# Patient Record
Sex: FEMALE | Race: BLACK OR AFRICAN AMERICAN | NOT HISPANIC OR LATINO | Employment: UNEMPLOYED | URBAN - METROPOLITAN AREA
[De-identification: names, ages, dates, MRNs, and addresses within clinical notes are randomized per-mention and may not be internally consistent; named-entity substitution may affect disease eponyms.]

---

## 2022-04-10 ENCOUNTER — HISTORICAL (OUTPATIENT)
Dept: ADMINISTRATIVE | Facility: HOSPITAL | Age: 27
End: 2022-04-10

## 2022-04-11 ENCOUNTER — HISTORICAL (OUTPATIENT)
Dept: ADMINISTRATIVE | Facility: HOSPITAL | Age: 27
End: 2022-04-11

## 2022-04-28 VITALS
SYSTOLIC BLOOD PRESSURE: 135 MMHG | DIASTOLIC BLOOD PRESSURE: 88 MMHG | HEIGHT: 67 IN | OXYGEN SATURATION: 100 % | BODY MASS INDEX: 24.6 KG/M2 | WEIGHT: 156.75 LBS

## 2022-04-28 VITALS
HEIGHT: 67 IN | WEIGHT: 156.75 LBS | SYSTOLIC BLOOD PRESSURE: 135 MMHG | OXYGEN SATURATION: 100 % | DIASTOLIC BLOOD PRESSURE: 88 MMHG | BODY MASS INDEX: 24.6 KG/M2

## 2022-05-19 ENCOUNTER — HOSPITAL ENCOUNTER (EMERGENCY)
Facility: HOSPITAL | Age: 27
Discharge: HOME | End: 2022-05-19
Attending: STUDENT IN AN ORGANIZED HEALTH CARE EDUCATION/TRAINING PROGRAM

## 2022-05-19 VITALS
OXYGEN SATURATION: 99 % | HEART RATE: 96 BPM | DIASTOLIC BLOOD PRESSURE: 74 MMHG | RESPIRATION RATE: 16 BRPM | HEIGHT: 67 IN | SYSTOLIC BLOOD PRESSURE: 120 MMHG | TEMPERATURE: 98 F | BODY MASS INDEX: 24.55 KG/M2

## 2022-05-19 DIAGNOSIS — R00.2 PALPITATIONS: ICD-10-CM

## 2022-05-19 LAB
ALBUMIN SERPL-MCNC: 4.3 GM/DL (ref 3.5–5)
ALBUMIN/GLOB SERPL: 1 RATIO (ref 1.1–2)
ALP SERPL-CCNC: 80 UNIT/L (ref 40–150)
ALT SERPL-CCNC: 7 UNIT/L (ref 0–55)
AMPHET UR QL SCN: NEGATIVE
APPEARANCE UR: CLEAR
AST SERPL-CCNC: 16 UNIT/L (ref 5–34)
BACTERIA #/AREA URNS AUTO: ABNORMAL /HPF
BARBITURATE SCN PRESENT UR: NEGATIVE
BASOPHILS # BLD AUTO: 0.02 X10(3)/MCL (ref 0–0.2)
BASOPHILS NFR BLD AUTO: 0.3 %
BENZODIAZ UR QL SCN: NEGATIVE
BILIRUB UR QL STRIP.AUTO: NEGATIVE MG/DL
BILIRUBIN DIRECT+TOT PNL SERPL-MCNC: 0.3 MG/DL
BUN SERPL-MCNC: 8.6 MG/DL (ref 7–18.7)
CALCIUM SERPL-MCNC: 9.8 MG/DL (ref 8.4–10.2)
CANNABINOIDS UR QL SCN: NEGATIVE
CHLORIDE SERPL-SCNC: 100 MMOL/L (ref 98–107)
CO2 SERPL-SCNC: 26 MMOL/L (ref 22–29)
COCAINE UR QL SCN: NEGATIVE
COLOR UR AUTO: YELLOW
CREAT SERPL-MCNC: 0.76 MG/DL (ref 0.55–1.02)
EOSINOPHIL # BLD AUTO: 0.01 X10(3)/MCL (ref 0–0.9)
EOSINOPHIL NFR BLD AUTO: 0.1 %
ERYTHROCYTE [DISTWIDTH] IN BLOOD BY AUTOMATED COUNT: 11 % (ref 11.5–17)
FENTANYL UR QL SCN: NEGATIVE
GLOBULIN SER-MCNC: 4.2 GM/DL (ref 2.4–3.5)
GLUCOSE SERPL-MCNC: 109 MG/DL (ref 74–100)
GLUCOSE UR QL STRIP.AUTO: NORMAL MG/DL
HCT VFR BLD AUTO: 36.7 % (ref 37–47)
HGB BLD-MCNC: 12.2 GM/DL (ref 12–16)
HYALINE CASTS #/AREA URNS LPF: ABNORMAL /LPF
IMM GRANULOCYTES # BLD AUTO: 0.02 X10(3)/MCL (ref 0–0.02)
IMM GRANULOCYTES NFR BLD AUTO: 0.3 % (ref 0–0.43)
KETONES UR QL STRIP.AUTO: NEGATIVE MG/DL
LEUKOCYTE ESTERASE UR QL STRIP.AUTO: NEGATIVE UNIT/L
LYMPHOCYTES # BLD AUTO: 1.13 X10(3)/MCL (ref 0.6–4.6)
LYMPHOCYTES NFR BLD AUTO: 16.8 %
MCH RBC QN AUTO: 30.7 PG (ref 27–31)
MCHC RBC AUTO-ENTMCNC: 33.2 MG/DL (ref 33–36)
MCV RBC AUTO: 92.2 FL (ref 80–94)
MDMA UR QL SCN: NEGATIVE
MONOCYTES # BLD AUTO: 0.45 X10(3)/MCL (ref 0.1–1.3)
MONOCYTES NFR BLD AUTO: 6.7 %
MUCOUS THREADS URNS QL MICRO: ABNORMAL /LPF
NEUTROPHILS # BLD AUTO: 5.1 X10(3)/MCL (ref 2.1–9.2)
NEUTROPHILS NFR BLD AUTO: 75.8 %
NITRITE UR QL STRIP.AUTO: NEGATIVE
NRBC BLD AUTO-RTO: 0 %
OPIATES UR QL SCN: NEGATIVE
PCP UR QL: NEGATIVE
PH UR STRIP.AUTO: 6 [PH]
PH UR: 7 [PH] (ref 3–11)
PLATELET # BLD AUTO: 334 X10(3)/MCL (ref 130–400)
PMV BLD AUTO: 9.5 FL (ref 9.4–12.4)
POTASSIUM SERPL-SCNC: 3.2 MMOL/L (ref 3.5–5.1)
PROT SERPL-MCNC: 8.5 GM/DL (ref 6.4–8.3)
PROT UR QL STRIP.AUTO: ABNORMAL MG/DL
RBC # BLD AUTO: 3.98 X10(6)/MCL (ref 4.2–5.4)
RBC #/AREA URNS AUTO: ABNORMAL /HPF
RBC UR QL AUTO: NEGATIVE UNIT/L
SODIUM SERPL-SCNC: 138 MMOL/L (ref 136–145)
SP GR UR STRIP.AUTO: 1.02
SPECIFIC GRAVITY, URINE AUTO (.000) (OHS): 1.02 (ref 1–1.03)
SQUAMOUS #/AREA URNS LPF: ABNORMAL /HPF
TSH SERPL-ACNC: 2.06 UIU/ML (ref 0.35–4.94)
UROBILINOGEN UR STRIP-ACNC: NORMAL MG/DL
WBC # SPEC AUTO: 6.7 X10(3)/MCL (ref 4.5–11.5)
WBC #/AREA URNS AUTO: ABNORMAL /HPF

## 2022-05-19 PROCEDURE — 84443 ASSAY THYROID STIM HORMONE: CPT | Performed by: NURSE PRACTITIONER

## 2022-05-19 PROCEDURE — 84075 ASSAY ALKALINE PHOSPHATASE: CPT | Performed by: NURSE PRACTITIONER

## 2022-05-19 PROCEDURE — 80307 DRUG TEST PRSMV CHEM ANLYZR: CPT | Performed by: NURSE PRACTITIONER

## 2022-05-19 PROCEDURE — 85025 COMPLETE CBC W/AUTO DIFF WBC: CPT | Performed by: NURSE PRACTITIONER

## 2022-05-19 PROCEDURE — 99285 EMERGENCY DEPT VISIT HI MDM: CPT | Mod: 25

## 2022-05-19 PROCEDURE — 93005 ELECTROCARDIOGRAM TRACING: CPT

## 2022-05-19 PROCEDURE — 81001 URINALYSIS AUTO W/SCOPE: CPT | Performed by: NURSE PRACTITIONER

## 2022-05-19 PROCEDURE — 36415 COLL VENOUS BLD VENIPUNCTURE: CPT | Performed by: NURSE PRACTITIONER

## 2022-05-19 NOTE — ED PROVIDER NOTES
Encounter Date: 5/19/2022       History     Chief Complaint   Patient presents with    Palpitations     PT W CO MILD CP AND PAL. TODAY. REPORTS ANXIETY.   NO COUGH OR SOB REPORTED.  EKG OBTAINED.      Reports feeling that heart was racing earlier today with mild chest discomfort that makes her feel anxious, she denies sob/cough/fever, denies hx of anxiety, has a pcp appt on 6/2        Review of patient's allergies indicates:  No Known Allergies  History reviewed. No pertinent past medical history.  Past Surgical History:   Procedure Laterality Date    ABDOMINAL SURGERY      TONSILLECTOMY       No family history on file.  Social History     Tobacco Use    Smoking status: Never Smoker    Smokeless tobacco: Never Used   Substance Use Topics    Alcohol use: Not Currently    Drug use: Never     Review of Systems   Constitutional: Negative for fever.   HENT: Negative for sore throat.    Respiratory: Negative for shortness of breath.    Cardiovascular: Positive for chest pain and palpitations.   Gastrointestinal: Negative for nausea.   Genitourinary: Negative for dysuria.   Musculoskeletal: Negative for back pain.   Skin: Negative for rash.   Neurological: Negative for weakness.   Hematological: Does not bruise/bleed easily.   All other systems reviewed and are negative.      Physical Exam     Initial Vitals [05/19/22 1326]   BP Pulse Resp Temp SpO2   (!) 160/90 100 16 98.2 °F (36.8 °C) 100 %      MAP       --         Physical Exam    Nursing note and vitals reviewed.  Constitutional: She appears well-developed.   HENT:   Head: Normocephalic and atraumatic.   Eyes: Conjunctivae are normal.   Neck: Neck supple.   Normal range of motion.  Cardiovascular: Normal rate, regular rhythm, normal heart sounds and intact distal pulses.   Pulmonary/Chest: Breath sounds normal.   Abdominal: Abdomen is soft. Bowel sounds are normal.   Musculoskeletal:         General: Normal range of motion.      Cervical back: Normal range of  motion and neck supple.     Neurological: She is alert and oriented to person, place, and time. She has normal strength.   Skin: Skin is warm and dry.   Psychiatric: She has a normal mood and affect.         ED Course   Procedures  Labs Reviewed   COMPREHENSIVE METABOLIC PANEL - Abnormal; Notable for the following components:       Result Value    Potassium Level 3.2 (*)     Glucose Level 109 (*)     Protein Total 8.5 (*)     Globulin 4.2 (*)     Albumin/Globulin Ratio 1.0 (*)     All other components within normal limits   URINALYSIS - Abnormal; Notable for the following components:    Protein, UA Trace (*)     Bacteria, UA Trace (*)     Squamous Epithelial Cells, UA Few (*)     Mucous, UA Trace (*)     All other components within normal limits   CBC WITH DIFFERENTIAL - Abnormal; Notable for the following components:    RBC 3.98 (*)     Hct 36.7 (*)     RDW 11.0 (*)     IG# 0.02 (*)     All other components within normal limits   DRUG SCREEN PANEL, URINE EMERGENCY - Normal   TSH - Normal   CBC W/ AUTO DIFFERENTIAL    Narrative:     The following orders were created for panel order CBC auto differential.  Procedure                               Abnormality         Status                     ---------                               -----------         ------                     CBC with Differential[[id number]]        Abnormal            Final result                 Please view results for these tests on the individual orders.   EXTRA TUBES    Narrative:     The following orders were created for panel order EXTRA TUBES.  Procedure                               Abnormality         Status                     ---------                               -----------         ------                     Light Blue Top Hold[[id number]]                              In process                 Gold Top Hold[[id number]]                                    In process                   Please view results for these tests on the individual  orders.   LIGHT BLUE TOP HOLD   GOLD TOP HOLD     EKG Readings: (Independently Interpreted)   Initial Reading: No STEMI. Rhythm: Sinus Tachycardia. Ectopy: Rare PVCs. Axis: Right Axis Deviation. Clinical Impression: with PVCs     ECG Results          EKG 12-lead (Rapid Heart Beat / Palpitations) Age > 50 (In process)  Result time 05/19/22 13:32:39    In process by Interface, Lab In [facility] (05/19/22 13:32:39)                 Narrative:    Test Reason : R00.0,    Vent. Rate : 105 BPM     Atrial Rate : 105 BPM     P-R Int : 132 ms          QRS Dur : 064 ms      QT Int : 352 ms       P-R-T Axes : 081 092 030 degrees     QTc Int : 465 ms    Sinus tachycardia with occasional Premature ventricular complexes  Rightward axis  Borderline Abnormal ECG  No previous ECGs available    Referred By: PHYSICIAN ER           Confirmed By:                             Imaging Results          X-Ray Chest AP Portable (Final result)  Result time 05/19/22 17:10:11    Final result by Александр Ryan MD (05/19/22 17:10:11)                 Impression:      No acute findings.      Electronically signed by: Александр Ryan  Date:    05/19/2022  Time:    17:10             Narrative:    EXAMINATION:  XR CHEST AP PORTABLE    CLINICAL HISTORY:  Palpitations    COMPARISON:  No priors    FINDINGS:  Portable frontal view of the chest was obtained. The heart is not enlarged.  Lungs are clear.  There is no pneumothorax or significant effusion.                                 Medications - No data to display  Medical Decision Making:   History:   Old Records Summarized: records from clinic visits and records from previous admission(s).                      Clinical Impression:   Final diagnoses:  [R00.2] Palpitations          ED Disposition Condition    Discharge Stable        ED Prescriptions     None        Follow-up Information     Follow up With Specialties Details Why Contact Info [facility] Emergency Medicine  If symptoms  [place] [address] [facility] [id number] [phone]    follow up at scheduled pcp appt on 6/2               JOSE Devlin  05/19/22 3895

## 2022-06-02 ENCOUNTER — OFFICE VISIT (OUTPATIENT)
Dept: INTERNAL MEDICINE | Facility: CLINIC | Age: 27
End: 2022-06-02

## 2022-06-02 VITALS
BODY MASS INDEX: 27.41 KG/M2 | HEIGHT: 67 IN | DIASTOLIC BLOOD PRESSURE: 82 MMHG | RESPIRATION RATE: 18 BRPM | WEIGHT: 174.63 LBS | HEART RATE: 100 BPM | TEMPERATURE: 98 F | SYSTOLIC BLOOD PRESSURE: 122 MMHG

## 2022-06-02 DIAGNOSIS — E87.6 HYPOKALEMIA: Chronic | ICD-10-CM

## 2022-06-02 DIAGNOSIS — R00.2 PALPITATIONS: Chronic | ICD-10-CM

## 2022-06-02 PROCEDURE — 99203 OFFICE O/P NEW LOW 30 MIN: CPT | Mod: S$PBB,,, | Performed by: NURSE PRACTITIONER

## 2022-06-02 PROCEDURE — 99214 OFFICE O/P EST MOD 30 MIN: CPT | Mod: PBBFAC | Performed by: NURSE PRACTITIONER

## 2022-06-02 PROCEDURE — 99203 PR OFFICE/OUTPT VISIT, NEW, LEVL III, 30-44 MIN: ICD-10-PCS | Mod: S$PBB,,, | Performed by: NURSE PRACTITIONER

## 2022-06-02 NOTE — PROGRESS NOTES
"Subjective:       Patient ID: Michael Colby is a 27 y.o. female.    Chief Complaint: Establish Care (Seen in ED for palpitations- denies any at present.)    Patient has diagnosis of Intermittent Tachycardia and Palpitations. Patient presents to clinic today to establish care. Patient's HR noted to be 100, states she gets anxious when coming to the doctor or hospital. Patient denies anxiousness any other time. Patient denies drug use. Patient denies chest pain, difficulty breathing, SOB. Patient denies medications.     Patient seen in ED on 05/19/2022 for feeling that heart was racing earlier today with mild chest discomfort that makes her feel anxious, she denies sob/cough/fever, denies hx of anxiety, has a pcp appt on 6/2. B/P noted to be elevated at 160/90. Potassium noted to be low at 3.2. EKG done, read as No STEMI. Rhythm: Sinus Tachycardia. Ectopy: Rare PVCs. Axis: Right Axis Deviation. Clinical Impression: with PVCs. CXR negative. Patient diagnosed with palpitations and discharged home.     Patient seen in Urgent Care on 04/15/2022 for " feels like something stuck on the right side of my throat"," are like something swallowing in there".  Patient state was seen for this issue on March 30, 2022 at the emergency room department.  Patient was referred to internal medicine for follow-up.  Patient states she get nervous when she comes to the hospital or go see a doctor and that is why her heart rate is up.  Patient denies short of breath or chest pain.  Patient requesting evaluation or referral to see why she continued to have this sensation to her right throat.  Denies any nausea, or vomiting, or diarrhea.  Denies any fever.  Patient state she can drink and eat without any problems or choking.  Denies being pregnant, last menstrual cycle April 12, 2022. Patient diagnosed with globus sensation. Discussed physical exam findings. Declined the need to go to the emergency room department for further testing and " evaluation. Stay hydrated with fluids especially water. ENT and PCP referral initiated. Instructed patient to come back to clinic or go to emergency room department if symptom worsens or no improvement or for any other reasons, or call 911.     Mammogram: deferred due to age  Pap:   FIT: deferred due to age  Bone Density: deferred due to age    Review of Systems   Constitutional: Negative.  Negative for activity change and unexpected weight change.   HENT: Negative.  Negative for hearing loss, rhinorrhea and trouble swallowing.    Eyes: Negative.  Negative for discharge and visual disturbance.   Respiratory: Negative.  Negative for chest tightness and wheezing.    Cardiovascular: Negative.  Negative for chest pain and palpitations.   Gastrointestinal: Negative.  Negative for blood in stool, constipation, diarrhea and vomiting.   Endocrine: Negative.  Negative for polydipsia and polyuria.   Genitourinary: Negative.  Negative for difficulty urinating, dysuria, hematuria and menstrual problem.   Musculoskeletal: Negative.  Negative for arthralgias, joint swelling and neck pain.   Integumentary:  Negative.   Neurological: Negative.  Negative for weakness and headaches.   Hematological: Negative.    Psychiatric/Behavioral: Negative.  Negative for confusion and dysphoric mood.         Objective:      Physical Exam  Vitals reviewed.   Constitutional:       Appearance: Normal appearance.   HENT:      Head: Normocephalic and atraumatic.      Mouth/Throat:      Mouth: Mucous membranes are moist.      Pharynx: Oropharynx is clear.   Eyes:      Extraocular Movements: Extraocular movements intact.      Conjunctiva/sclera: Conjunctivae normal.      Pupils: Pupils are equal, round, and reactive to light.   Cardiovascular:      Rate and Rhythm: Regular rhythm. Tachycardia present.      Heart sounds: Normal heart sounds.   Pulmonary:      Effort: Pulmonary effort is normal.      Breath sounds: Normal breath sounds.   Abdominal:       General: Bowel sounds are normal.   Musculoskeletal:         General: Normal range of motion.      Cervical back: Normal range of motion.   Skin:     General: Skin is warm.   Neurological:      Mental Status: She is alert and oriented to person, place, and time.   Psychiatric:         Mood and Affect: Mood normal.         Behavior: Behavior normal.         Assessment:       Problem List Items Addressed This Visit        Cardiac/Vascular    Palpitations (Chronic)     Cardiology referral completed           Relevant Orders    Ambulatory referral/consult to Cardiology       Renal/    Hypokalemia (Chronic)     Potassium level: 3.2  Repeat labs ordered           Relevant Orders    CBC Auto Differential    Comprehensive Metabolic Panel          Plan:    Follow up in 4 months with labs to be done prior to visit.

## 2022-06-03 ENCOUNTER — PATIENT MESSAGE (OUTPATIENT)
Dept: INTERNAL MEDICINE | Facility: CLINIC | Age: 27
End: 2022-06-03

## 2022-06-13 ENCOUNTER — OFFICE VISIT (OUTPATIENT)
Dept: INTERNAL MEDICINE | Facility: CLINIC | Age: 27
End: 2022-06-13

## 2022-06-13 VITALS
WEIGHT: 175.94 LBS | TEMPERATURE: 99 F | HEIGHT: 67 IN | DIASTOLIC BLOOD PRESSURE: 88 MMHG | HEART RATE: 106 BPM | BODY MASS INDEX: 27.61 KG/M2 | RESPIRATION RATE: 18 BRPM | SYSTOLIC BLOOD PRESSURE: 136 MMHG

## 2022-06-13 DIAGNOSIS — L60.0 INGROWN TOENAIL OF LEFT FOOT: Primary | ICD-10-CM

## 2022-06-13 DIAGNOSIS — E87.6 HYPOKALEMIA: Chronic | ICD-10-CM

## 2022-06-13 PROCEDURE — 99213 OFFICE O/P EST LOW 20 MIN: CPT | Mod: S$PBB,,, | Performed by: NURSE PRACTITIONER

## 2022-06-13 PROCEDURE — 99214 OFFICE O/P EST MOD 30 MIN: CPT | Mod: PBBFAC | Performed by: NURSE PRACTITIONER

## 2022-06-13 PROCEDURE — 99213 PR OFFICE/OUTPT VISIT, EST, LEVL III, 20-29 MIN: ICD-10-PCS | Mod: S$PBB,,, | Performed by: NURSE PRACTITIONER

## 2022-06-13 RX ORDER — MUPIROCIN 20 MG/G
OINTMENT TOPICAL 2 TIMES DAILY
Qty: 22 G | Refills: 2 | Status: SHIPPED | OUTPATIENT
Start: 2022-06-13 | End: 2022-06-27

## 2022-06-13 RX ORDER — CEPHALEXIN 500 MG/1
500 CAPSULE ORAL EVERY 6 HOURS
Qty: 56 CAPSULE | Refills: 0 | Status: SHIPPED | OUTPATIENT
Start: 2022-06-13 | End: 2022-06-27

## 2022-06-13 NOTE — PROGRESS NOTES
"Subjective:       Patient ID: Michael Colby is a 27 y.o. female.    Chief Complaint: Ingrown Toenail    Patient has diagnosis of Intermittent Tachycardia and Palpitations. Patient presents to clinic today to establish care. Patient's HR noted to be 100, states she gets anxious when coming to the doctor or hospital. Patient denies anxiousness any other time. Patient denies drug use. Patient denies chest pain, difficulty breathing, SOB. Patient denies medications.     Patient seen in ED on 05/19/2022 for feeling that heart was racing earlier today with mild chest discomfort that makes her feel anxious, she denies sob/cough/fever, denies hx of anxiety, has a pcp appt on 6/2. B/P noted to be elevated at 160/90. Potassium noted to be low at 3.2. EKG done, read as No STEMI. Rhythm: Sinus Tachycardia. Ectopy: Rare PVCs. Axis: Right Axis Deviation. Clinical Impression: with PVCs. CXR negative. Patient diagnosed with palpitations and discharged home.     Patient seen in Urgent Care on 04/15/2022 for " feels like something stuck on the right side of my throat"," are like something swallowing in there".  Patient state was seen for this issue on March 30, 2022 at the emergency room department.  Patient was referred to internal medicine for follow-up.  Patient states she get nervous when she comes to the hospital or go see a doctor and that is why her heart rate is up.  Patient denies short of breath or chest pain.  Patient requesting evaluation or referral to see why she continued to have this sensation to her right throat.  Denies any nausea, or vomiting, or diarrhea.  Denies any fever.  Patient state she can drink and eat without any problems or choking.  Denies being pregnant, last menstrual cycle April 12, 2022. Patient diagnosed with globus sensation. Discussed physical exam findings. Declined the need to go to the emergency room department for further testing and evaluation. Stay hydrated with fluids especially water. ENT " and PCP referral initiated. Instructed patient to come back to clinic or go to emergency room department if symptom worsens or no improvement or for any other reasons, or call 911.     Mammogram: deferred due to age  Pap:   FIT: deferred due to age  Bone Density: deferred due to age    Review of Systems   Constitutional: Negative.  Negative for activity change and unexpected weight change.   HENT: Negative.  Negative for hearing loss, rhinorrhea and trouble swallowing.    Eyes: Negative.  Negative for discharge and visual disturbance.   Respiratory: Negative.  Negative for chest tightness and wheezing.    Cardiovascular: Negative.  Negative for chest pain and palpitations.   Gastrointestinal: Negative.  Negative for blood in stool, constipation, diarrhea and vomiting.   Endocrine: Negative.  Negative for polydipsia and polyuria.   Genitourinary: Negative.  Negative for difficulty urinating, dysuria, hematuria and menstrual problem.   Musculoskeletal: Negative.  Negative for arthralgias, joint swelling and neck pain.   Integumentary:  Negative.   Neurological: Negative.  Negative for weakness and headaches.   Hematological: Negative.    Psychiatric/Behavioral: Negative.  Negative for confusion and dysphoric mood.         Objective:      Physical Exam  Vitals reviewed.   Constitutional:       Appearance: Normal appearance.   HENT:      Head: Normocephalic and atraumatic.      Mouth/Throat:      Mouth: Mucous membranes are moist.      Pharynx: Oropharynx is clear.   Eyes:      Extraocular Movements: Extraocular movements intact.      Conjunctiva/sclera: Conjunctivae normal.      Pupils: Pupils are equal, round, and reactive to light.   Cardiovascular:      Rate and Rhythm: Regular rhythm. Tachycardia present.      Heart sounds: Normal heart sounds.   Pulmonary:      Effort: Pulmonary effort is normal.      Breath sounds: Normal breath sounds.   Abdominal:      General: Bowel sounds are normal.   Musculoskeletal:          General: Normal range of motion.      Cervical back: Normal range of motion.   Skin:     General: Skin is warm.   Neurological:      Mental Status: She is alert and oriented to person, place, and time.   Psychiatric:         Mood and Affect: Mood normal.         Behavior: Behavior normal.         Assessment:       Problem List Items Addressed This Visit        Derm    Ingrown toenail of left foot - Primary    Relevant Orders    Ambulatory referral/consult to Family Practice          Plan:    Follow up in 4 months with labs to be done prior to visit.    Subjective:       Patient ID: Michael Colby is a 27 y.o. female.    Chief Complaint: Ingrown Toenail    HPI  Review of Systems      Objective:      Physical Exam    Assessment:       Problem List Items Addressed This Visit        Derm    Ingrown toenail of left foot - Primary    Relevant Orders    Ambulatory referral/consult to Family Practice          Plan:    Follow up appointment scheduled for 10/03/2022, continue with that appointment.

## 2022-06-13 NOTE — ASSESSMENT & PLAN NOTE
Referral to Family Medicine [facility]  Continue soaking left foot in warm water and Epsom salt twice a day for 20 minutes at a time. After foot soak, dry foot well and apply Bactroban ointment to left great toe bid x2 weeks and cover with gauze or bandage.   Start Keflex 500 mg Q6h x14 days.

## 2022-06-13 NOTE — PROGRESS NOTES
"Subjective:       Patient ID: Michael Colby is a 27 y.o. female.    Chief Complaint: Ingrown Toenail    Patient has diagnosis of Intermittent Tachycardia and Palpitations. Patient presents to clinic today for ingrown toenail. Patient states she gets ingrown toenails often. States has had this one for a couple weeks but has worsened over the last week. Patient states she is currently soaking her foot in Epsom salt but not helping. Patient denies any other acute complaints. Patient last seen in clinic on 06/02/2022 for tachycardia and palpitations. Hypokalemia noted, repeat labs ordered, patient did not complete. Patient's HR noted to be 100 during that visit, patient stated she gets anxious when coming to the doctor or hospital. Patient denies anxiousness any other time. Patient denies drug use. Patient denies chest pain, difficulty breathing, SOB. Patient denies medications. Cardiology referral made on 06/02/2022.     Patient seen in ED on 05/19/2022 for feeling that heart was racing earlier today with mild chest discomfort that makes her feel anxious, she denies sob/cough/fever, denies hx of anxiety, has a pcp appt on 6/2. B/P noted to be elevated at 160/90. Potassium noted to be low at 3.2. EKG done, read as No STEMI. Rhythm: Sinus Tachycardia. Ectopy: Rare PVCs. Axis: Right Axis Deviation. Clinical Impression: with PVCs. CXR negative. Patient diagnosed with palpitations and discharged home.     Patient seen in Urgent Care on 04/15/2022 for " feels like something stuck on the right side of my throat"," are like something swallowing in there".  Patient state was seen for this issue on March 30, 2022 at the emergency room department.  Patient was referred to internal medicine for follow-up.  Patient states she get nervous when she comes to the hospital or go see a doctor and that is why her heart rate is up.  Patient denies short of breath or chest pain.  Patient requesting evaluation or referral to see why she " continued to have this sensation to her right throat.  Denies any nausea, or vomiting, or diarrhea.  Denies any fever.  Patient state she can drink and eat without any problems or choking.  Denies being pregnant, last menstrual cycle April 12, 2022. Patient diagnosed with globus sensation. Discussed physical exam findings. Declined the need to go to the emergency room department for further testing and evaluation. Stay hydrated with fluids especially water. ENT and PCP referral initiated. Instructed patient to come back to clinic or go to emergency room department if symptom worsens or no improvement or for any other reasons, or call 911.     Mammogram: deferred due to age  Pap: unknown  FIT: deferred due to age  Bone Density: deferred due to age    Ingrown Toenail      Review of Systems   Constitutional: Negative.    HENT: Negative.    Eyes: Negative.    Respiratory: Negative.    Cardiovascular: Negative.    Gastrointestinal: Negative.    Endocrine: Negative.    Genitourinary: Negative.    Musculoskeletal: Negative.         Left great toe pain   Integumentary:  Negative.   Allergic/Immunologic: Negative.    Neurological: Negative.    Hematological: Negative.    Psychiatric/Behavioral: Negative.          Objective:      Physical Exam  Vitals reviewed.   Constitutional:       Appearance: Normal appearance.   HENT:      Head: Normocephalic and atraumatic.      Mouth/Throat:      Mouth: Mucous membranes are moist.      Pharynx: Oropharynx is clear.   Eyes:      Extraocular Movements: Extraocular movements intact.      Conjunctiva/sclera: Conjunctivae normal.      Pupils: Pupils are equal, round, and reactive to light.   Cardiovascular:      Rate and Rhythm: Normal rate and regular rhythm.      Heart sounds: Normal heart sounds.   Pulmonary:      Effort: Pulmonary effort is normal.      Breath sounds: Normal breath sounds.   Abdominal:      General: Bowel sounds are normal.   Musculoskeletal:         General: Normal  range of motion.      Cervical back: Normal range of motion.        Feet:    Feet:      Left foot:      Skin integrity: Erythema present.      Toenail Condition: Left toenails are ingrown.   Skin:     General: Skin is warm and dry.   Neurological:      Mental Status: She is alert and oriented to person, place, and time.   Psychiatric:         Mood and Affect: Mood normal.         Behavior: Behavior normal.         Assessment:       Problem List Items Addressed This Visit        Derm    Ingrown toenail of left foot - Primary     Referral to Family Medicine [facility]  Continue soaking left foot in warm water and Epsom salt twice a day for 20 minutes at a time. After foot soak, dry foot well and apply Bactroban ointment to left great toe bid x2 weeks and cover with gauze or bandage.   Start Keflex 500 mg Q6h x14 days.            Relevant Orders    Ambulatory referral/consult to Family Practice       Renal/    Hypokalemia (Chronic)     Potassium level: 3.2  Repeat labs ordered  Patient encouraged to have labs completed                 Plan:    Patient has follow up appointment scheduled for 10/03/2022.

## 2022-07-13 ENCOUNTER — TELEPHONE (OUTPATIENT)
Dept: INTERNAL MEDICINE | Facility: CLINIC | Age: 27
End: 2022-07-13

## 2022-07-18 ENCOUNTER — OFFICE VISIT (OUTPATIENT)
Dept: FAMILY MEDICINE | Facility: CLINIC | Age: 27
End: 2022-07-18

## 2022-07-18 VITALS
OXYGEN SATURATION: 100 % | DIASTOLIC BLOOD PRESSURE: 96 MMHG | WEIGHT: 174 LBS | RESPIRATION RATE: 18 BRPM | HEART RATE: 122 BPM | SYSTOLIC BLOOD PRESSURE: 142 MMHG | BODY MASS INDEX: 27.31 KG/M2 | HEIGHT: 67 IN | TEMPERATURE: 98 F

## 2022-07-18 DIAGNOSIS — L60.0 INGROWN TOENAIL OF LEFT FOOT: ICD-10-CM

## 2022-07-18 PROCEDURE — 99214 OFFICE O/P EST MOD 30 MIN: CPT | Mod: PBBFAC

## 2022-07-18 PROCEDURE — 11730 AVULSION NAIL PLATE SIMPLE 1: CPT | Mod: PBBFAC

## 2022-07-18 RX ORDER — LIDOCAINE HYDROCHLORIDE 10 MG/ML
10 INJECTION, SOLUTION EPIDURAL; INFILTRATION; INTRACAUDAL; PERINEURAL
Status: COMPLETED | OUTPATIENT
Start: 2022-07-18 | End: 2022-07-18

## 2022-07-18 RX ORDER — CLINDAMYCIN HYDROCHLORIDE 150 MG/1
150 CAPSULE ORAL EVERY 8 HOURS
Qty: 30 CAPSULE | Refills: 0 | Status: SHIPPED | OUTPATIENT
Start: 2022-07-18 | End: 2022-07-28

## 2022-07-18 RX ADMIN — LIDOCAINE HYDROCHLORIDE 100 MG: 10 INJECTION, SOLUTION EPIDURAL; INFILTRATION; INTRACAUDAL; PERINEURAL at 08:07

## 2022-07-18 NOTE — PROGRESS NOTES
I have seen the patient, reviewed the resident's history and physical, assessment, plan, and progress note. I have personally interviewed and examined the patient at bedside and: agree with the findings.     Sukumar Menon MD  [facility]

## 2022-07-18 NOTE — PROGRESS NOTES
Subjective:       Patient ID: Michael Colby is a 27 y.o. female.    Chief Complaint: Ingrown Toenail (Left great toe)    HPI   Pt is a 27yr old female presenting to minor procedures today because of an ingrown toenail. Is a recurrent problem, mom used to pull them out for her. Current ingrown toenail started about a few months ago. Yesterday pt noticed a blister came up around the toe nail on her L hallux. Admits pain when she touches it and has a bloody discharge. Denies any recent fevers or pus drainage.      Review of Systems   As per HPI       Objective:      Vitals:    07/18/22 0817   BP: (!) 142/96   Pulse: (!) 122   Resp: 18   Temp: 98.1 °F (36.7 °C)       Physical Exam    Gen: alert, no acute distress  Extremities: blister with blood pockets seen on lateral border of the L hallux. L hallux tender to palpation. Able to flex and extend L hallux without pain.   Psych: cooperative, appropriate mood and affect    Procedure Note:  Procedure: L Ingrown Toe Nail Removal  Performed by: Sukumar Menon MD  Consent: signed consent obtained after discussion of risks, benefits, and alternative treatments  Procedure: The area surrounding the skin lesion was prepared and draped in the usual sterile manner. The patient is placed in the supine position, with the knees flexed (foot flat on the table). The toe was prepped with alcohol wipes. A standard digital block was performed, using a 10-mL syringe and a 27-gauge needle. (About 2 to 3 mL of lidocaine on each side of the toe is usually sufficient for adequate anesthesia. A wait of five to 10 minutes allows the block to become effective.) A sterile tourniquet was applied for the shortest time possible. The toe was rewashed with surgical solution. A nail elevator was slid under the cuticle to separate the nail plate from the overlying proximal nail fold. The lateral 20-30% of the nail plate was cut free using bandage scissors and gently pulled free with a hemostat. Debrided  granulation tissue and pus on L lateral side. Vaseline impregnated gauze was applied followed by a bulky gauze dressing.     Followup: The patient tolerated the procedure well without complications. Standard post-procedure care is explained and return precautions are given.  The patient tolerated the procedure well with no complications.     Assessment/Plan:  Ingrown toenail of left foot  -     Removed lateral portion of L hallux, pus and blood drainage noted. pt tolerated procedure well  -     Given clindamycin [id number]                    -     clindamycin (CLEOCIN) 150 MG capsule; Take 1 capsule (150 mg total) by mouth every 8 (eight) hours. for 10 days  Dispense: 30 capsule; Refill: 0  -     Postop Nail care was provided to patient   OTHER ORDERS  -     LIDOcaine (PF) 10 mg/ml (1%) injection 100 mg     Follow up as needed if symptoms worsen or fail to improve.    Jose J Tran MD  [facility]

## 2022-10-03 DIAGNOSIS — E87.6 HYPOKALEMIA: Primary | ICD-10-CM

## 2023-02-13 ENCOUNTER — OFFICE VISIT (OUTPATIENT)
Dept: FAMILY MEDICINE | Facility: CLINIC | Age: 28
End: 2023-02-13

## 2023-02-13 VITALS
DIASTOLIC BLOOD PRESSURE: 77 MMHG | SYSTOLIC BLOOD PRESSURE: 121 MMHG | HEIGHT: 67 IN | OXYGEN SATURATION: 100 % | WEIGHT: 176 LBS | TEMPERATURE: 99 F | BODY MASS INDEX: 27.62 KG/M2 | RESPIRATION RATE: 18 BRPM | HEART RATE: 78 BPM

## 2023-02-13 DIAGNOSIS — L60.0 INGROWN NAIL: Primary | ICD-10-CM

## 2023-02-13 PROCEDURE — 99213 OFFICE O/P EST LOW 20 MIN: CPT | Mod: PBBFAC

## 2023-02-13 PROCEDURE — 11730 AVULSION NAIL PLATE SIMPLE 1: CPT | Mod: PBBFAC | Performed by: STUDENT IN AN ORGANIZED HEALTH CARE EDUCATION/TRAINING PROGRAM

## 2023-02-13 RX ORDER — LIDOCAINE HYDROCHLORIDE 10 MG/ML
5 INJECTION, SOLUTION EPIDURAL; INFILTRATION; INTRACAUDAL; PERINEURAL
Status: COMPLETED | OUTPATIENT
Start: 2023-02-13 | End: 2023-02-13

## 2023-02-13 RX ADMIN — LIDOCAINE HYDROCHLORIDE 50 MG: 10 INJECTION, SOLUTION EPIDURAL; INFILTRATION; INTRACAUDAL; PERINEURAL at 09:02

## 2023-02-13 NOTE — LETTER
February 13, 2023    Michael Colby  [address]  [facility] [address]             [facility]  Family Medicine  [address]  [facility] [address]  Phone: [phone]   February 13, 2023     Patient: Michael Colby   YOB: 1995   Date of Visit: 2/13/2023       To Whom it May Concern:    Michael Colby was seen in my clinic on 2/13/2023. She may return to work on [id number].    Please excuse her from any classes or work missed.    If you have any questions or concerns, please don't hesitate to call.    Sincerely,         Kimo Wood LPN

## 2023-02-13 NOTE — PROGRESS NOTES
[facility]     26 y/o female here for skin evaluation.     HPI:  Patient has a hx of ingrown toenail on her left foot affecting her great toe. She states last July she has to get this same toenail removed secondary to ingrown toenail. She reports after the procedure last July the toenail grew back appropriately. The last few months she has been wearing very tight and closed toed shoes. She has noticed bleeding and drainage from the nail bed over the last month. She also reports pain when walking. She denies fever and chills.    ROS   See above     PE   Vitals:    02/13/23 1026   BP: 121/77   Pulse: 78   Resp: 18   Temp: 98.6 °F (37 °C)     General: Pleasant and cooperative female in no acute distress   Skin: Left foot- Kristy toe-Erythemic, yellow pus near medial and lateral nail plate, nail plate tender to palpation      A/P  Ingrown nail   -Ingrown nail removed in office  -See procedure note     RTC PRN

## 2023-02-13 NOTE — PROGRESS NOTES
PROCEDURE: toenail avulsion  Performing Physician: Diana Duran MD  Supervising Physician: Sukumar Menon MD      PROCEDURE:   The area surrounding the skin lesion was prepared and draped in the usual sterile manner. The patient is placed in the supine position, with the knees flexed (foot flat on the table) or extended (foot hanging off the end of the table).  The toe was prepped with chlrohexadine solution. A standard digital block was performed, using a 10-mL syringe and a 27-gauge needle. (About 2 to 3 mL of lidocaine on each side of the toe is usually sufficient for adequate anesthesia. A wait of five to 10 minutes allows the block to become effective. )  A sterile tourniquet was applied for the shortest time possible.  The toe was rewashed with surgical solution.  A nail elevator was slid under the cuticle to separate the nail plate from the overlying proximal nail fold.  The entire nail plate was cut free using bandage scissors and gently pulled free with a hemostat.    Vaseline impregnated gauze was applied followed by a bulky gauze dressing.      Followup: The patient tolerated the procedure well without complications.  Standard post-procedure care is explained and return precautions are given.

## 2023-04-04 ENCOUNTER — PATIENT MESSAGE (OUTPATIENT)
Dept: RESEARCH | Facility: HOSPITAL | Age: 28
End: 2023-04-04

## 2023-04-11 ENCOUNTER — PATIENT MESSAGE (OUTPATIENT)
Dept: RESEARCH | Facility: HOSPITAL | Age: 28
End: 2023-04-11

## 2023-04-19 ENCOUNTER — PATIENT MESSAGE (OUTPATIENT)
Dept: RESEARCH | Facility: HOSPITAL | Age: 28
End: 2023-04-19

## 2023-08-28 ENCOUNTER — OFFICE VISIT (OUTPATIENT)
Dept: FAMILY MEDICINE | Facility: CLINIC | Age: 28
End: 2023-08-28

## 2023-08-28 VITALS
HEIGHT: 67 IN | DIASTOLIC BLOOD PRESSURE: 74 MMHG | OXYGEN SATURATION: 99 % | TEMPERATURE: 99 F | HEART RATE: 102 BPM | WEIGHT: 177 LBS | BODY MASS INDEX: 27.78 KG/M2 | RESPIRATION RATE: 18 BRPM | SYSTOLIC BLOOD PRESSURE: 137 MMHG

## 2023-08-28 DIAGNOSIS — L60.0 INGROWN TOENAIL OF LEFT FOOT: Primary | ICD-10-CM

## 2023-08-28 PROCEDURE — 11730 AVULSION NAIL PLATE SIMPLE 1: CPT | Mod: PBBFAC | Performed by: STUDENT IN AN ORGANIZED HEALTH CARE EDUCATION/TRAINING PROGRAM

## 2023-08-28 PROCEDURE — 99213 OFFICE O/P EST LOW 20 MIN: CPT | Mod: PBBFAC

## 2023-08-28 RX ORDER — LIDOCAINE HYDROCHLORIDE 10 MG/ML
5 INJECTION, SOLUTION EPIDURAL; INFILTRATION; INTRACAUDAL; PERINEURAL
Status: COMPLETED | OUTPATIENT
Start: 2023-08-28 | End: 2023-08-28

## 2023-08-28 RX ADMIN — LIDOCAINE HYDROCHLORIDE 50 MG: 10 INJECTION, SOLUTION EPIDURAL; INFILTRATION; INTRACAUDAL; PERINEURAL at 08:08

## 2023-08-28 NOTE — PROGRESS NOTES
[facility] Procedure Clinic Note    Subjective     Patient ID: Michael Colby is a 28 y.o. female.    Chief Complaint: Ingrown Toenail    Patient presents for ingrown toenail left great toe, removal 02/2023.  She she reports nail is growing in on both sides she has had pain and bleeding though no significant swelling or drainage.  Has not been on antibiotics, and she denies any allergies.    ROS   See HPI       Objective     Vitals:    08/28/23 0803   BP: 137/74   Pulse: 102   Resp: 18   Temp: 98.6 °F (37 °C)       General-well-appearing sitting in exam chair  CV-bilateral DP and PT pulses 2+  Skin-left great toe ingrown bilaterally small amount of dried bleeding, no drainage, induration, mildly tender.    Procedure:  Left great toenail removal  Diagnosis:  Onychocryptosis of Left great toenail  Permit: Signed informed consent  Details:  Prepped with Chlora-Prep, anesthesia with Lidocaine 1%, 5 ml.  Periosteal elevator used to avulse nail.  Hemostats used to remove nail. Dressed with petroleum jelly gauze and Coban.    EBL < 3 ml  Complications: none       Assessment and Plan     1. Ingrown toenail of left foot    Other orders  -     LIDOcaine (PF) 10 mg/ml (1%) injection 50 mg    - Ingrown toenail removal performed, tolerated well  - Wound care and RTC precautions discussed       Follow up if symptoms worsen or fail to improve.

## 2023-08-28 NOTE — LETTER
August 28, 2023    Michael Colby  [address]  [facility] [id number] [facility]  Family Medicine  [address]  [facility] [address]  Phone: [phone]   August 28, 2023     Patient: Michael Colby   YOB: 1995   Date of Visit: 8/28/2023       To Whom it May Concern:    Michael Colby was seen in my clinic on 8/28/2023. She may return to work on [id number] .    Please excuse her from any classes or work missed.    If you have any questions or concerns, please don't hesitate to call.    Sincerely,         Kimo Wood LPN

## 2024-08-27 ENCOUNTER — TELEPHONE (OUTPATIENT)
Dept: INTERNAL MEDICINE | Facility: CLINIC | Age: 29
End: 2024-08-27

## 2024-10-01 ENCOUNTER — OFFICE VISIT (OUTPATIENT)
Dept: URGENT CARE | Facility: CLINIC | Age: 29
End: 2024-10-01

## 2024-10-01 VITALS
RESPIRATION RATE: 20 BRPM | HEIGHT: 67 IN | TEMPERATURE: 98 F | BODY MASS INDEX: 29.45 KG/M2 | DIASTOLIC BLOOD PRESSURE: 80 MMHG | HEART RATE: 114 BPM | OXYGEN SATURATION: 99 % | SYSTOLIC BLOOD PRESSURE: 135 MMHG | WEIGHT: 187.63 LBS

## 2024-10-01 DIAGNOSIS — Z34.90 PREGNANCY, UNSPECIFIED GESTATIONAL AGE: Primary | ICD-10-CM

## 2024-10-01 DIAGNOSIS — N92.6 MISSED MENSES: ICD-10-CM

## 2024-10-01 LAB
B-HCG UR QL: POSITIVE
CTP QC/QA: YES

## 2024-10-01 PROCEDURE — 81025 URINE PREGNANCY TEST: CPT | Mod: PBBFAC | Performed by: STUDENT IN AN ORGANIZED HEALTH CARE EDUCATION/TRAINING PROGRAM

## 2024-10-01 PROCEDURE — 99214 OFFICE O/P EST MOD 30 MIN: CPT | Mod: PBBFAC | Performed by: STUDENT IN AN ORGANIZED HEALTH CARE EDUCATION/TRAINING PROGRAM

## 2024-10-01 PROCEDURE — 99213 OFFICE O/P EST LOW 20 MIN: CPT | Mod: S$PBB,,, | Performed by: STUDENT IN AN ORGANIZED HEALTH CARE EDUCATION/TRAINING PROGRAM

## 2024-10-01 RX ORDER — PRENATAL WITH FERROUS FUM AND FOLIC ACID 3080; 920; 120; 400; 22; 1.84; 3; 20; 10; 1; 12; 200; 27; 25; 2 [IU]/1; [IU]/1; MG/1; [IU]/1; MG/1; MG/1; MG/1; MG/1; MG/1; MG/1; UG/1; MG/1; MG/1; MG/1; MG/1
1 TABLET ORAL DAILY
Qty: 30 TABLET | Refills: 2 | Status: SHIPPED | OUTPATIENT
Start: 2024-10-01 | End: 2025-10-01

## 2024-10-01 NOTE — PROGRESS NOTES
"Subjective:      Patient ID: Michael Colby is a 29 y.o. female.    Vitals:  height is 5' 7" (1.702 m) and weight is 85.1 kg (187 lb 9.6 oz). Her oral temperature is 98.1 °F (36.7 °C). Her blood pressure is 135/80 and her pulse is 114 (abnormal). Her respiration is 20 and oxygen saturation is 99%.     Chief Complaint: Abdominal Pain (Pt states x 1 week off and on abdominal pain in the upper stomach. )    HPI  Michael Colby is a 21-year-old female presenting to the urgent care clinic today with a missed menstrual cycle.  Patient states that her last menstrual cycle was on 08/17/2024.  She did not have a menstrual cycle during the month of September, and she is concerned for pregnancy.  She is complaining of mild abdominal discomfort at this time.  Patient is currently sexually active with a male partner.  She denies any vaginal discharge.  She denies any vaginal bleeding.  She denies any nausea or vomiting.    ROS   Objective:     Physical Exam   Constitutional: She is oriented to person, place, and time. She appears well-developed.   HENT:   Head: Normocephalic and atraumatic.   Ears:   Right Ear: External ear normal.   Left Ear: External ear normal.   Nose: Nose normal.   Mouth/Throat: Mucous membranes are normal.   Eyes: Conjunctivae and lids are normal.   Neck: Trachea normal. Neck supple.   Cardiovascular: Normal rate, regular rhythm and normal heart sounds.   Pulmonary/Chest: Effort normal and breath sounds normal. No respiratory distress.   Abdominal: Normal appearance and bowel sounds are normal. She exhibits no distension and no mass. Soft. There is no abdominal tenderness.   Musculoskeletal: Normal range of motion.         General: Normal range of motion.   Neurological: She is alert and oriented to person, place, and time. She has normal strength.   Skin: Skin is warm, dry, intact, not diaphoretic and not pale.   Psychiatric: Her speech is normal and behavior is normal. Judgment and thought content normal. "   Nursing note and vitals reviewed.      Assessment:     1. Pregnancy, unspecified gestational age    2. Missed menses        Plan:       Pregnancy, unspecified gestational age  -     POCT urine pregnancy  -     Ambulatory referral/consult to Obstetrics / Gynecology  -     PNV,calcium 72/iron/folic acid (PRENATAL VITAMIN) Tab; Take 1 tablet by mouth once daily.  Dispense: 30 tablet; Refill: 2    Missed menses      This note is dictated using the M*Modal Fluency Direct word recognition program. There are word recognition mistakes that are occasionally missed on review.    Rob Royal MD

## 2024-10-08 ENCOUNTER — HOSPITAL ENCOUNTER (EMERGENCY)
Facility: HOSPITAL | Age: 29
Discharge: HOME | End: 2024-10-08
Attending: EMERGENCY MEDICINE

## 2024-10-08 VITALS
OXYGEN SATURATION: 99 % | RESPIRATION RATE: 18 BRPM | DIASTOLIC BLOOD PRESSURE: 87 MMHG | BODY MASS INDEX: 29 KG/M2 | HEART RATE: 76 BPM | SYSTOLIC BLOOD PRESSURE: 127 MMHG | TEMPERATURE: 98 F | WEIGHT: 185.19 LBS

## 2024-10-08 DIAGNOSIS — O20.9 VAGINAL BLEEDING IN PREGNANCY, FIRST TRIMESTER: Primary | ICD-10-CM

## 2024-10-08 DIAGNOSIS — N30.00 ACUTE CYSTITIS WITHOUT HEMATURIA: ICD-10-CM

## 2024-10-08 LAB
ALBUMIN SERPL-MCNC: 4.1 G/DL (ref 3.5–5)
ALBUMIN/GLOB SERPL: 1 RATIO (ref 1.1–2)
ALP SERPL-CCNC: 71 UNIT/L (ref 40–150)
ALT SERPL-CCNC: 7 UNIT/L (ref 0–55)
ANION GAP SERPL CALC-SCNC: 8 MEQ/L
AST SERPL-CCNC: 13 UNIT/L (ref 5–34)
B-HCG FREE SERPL-ACNC: ABNORMAL MIU/ML
B-HCG UR QL: POSITIVE
BACTERIA #/AREA URNS AUTO: ABNORMAL /HPF
BASOPHILS # BLD AUTO: 0.03 X10(3)/MCL
BASOPHILS NFR BLD AUTO: 0.3 %
BILIRUB SERPL-MCNC: 0.2 MG/DL
BILIRUB UR QL STRIP.AUTO: NEGATIVE
BUN SERPL-MCNC: 10.6 MG/DL (ref 7–18.7)
CALCIUM SERPL-MCNC: 10.4 MG/DL (ref 8.4–10.2)
CHLORIDE SERPL-SCNC: 106 MMOL/L (ref 98–107)
CLARITY UR: ABNORMAL
CO2 SERPL-SCNC: 23 MMOL/L (ref 22–29)
COLOR UR AUTO: YELLOW
CREAT SERPL-MCNC: 0.69 MG/DL (ref 0.55–1.02)
CREAT/UREA NIT SERPL: 15
CTP QC/QA: YES
EOSINOPHIL # BLD AUTO: 0.03 X10(3)/MCL (ref 0–0.9)
EOSINOPHIL NFR BLD AUTO: 0.3 %
ERYTHROCYTE [DISTWIDTH] IN BLOOD BY AUTOMATED COUNT: 11.6 % (ref 11.5–17)
GFR SERPLBLD CREATININE-BSD FMLA CKD-EPI: >60 ML/MIN/1.73/M2
GLOBULIN SER-MCNC: 4 GM/DL (ref 2.4–3.5)
GLUCOSE SERPL-MCNC: 110 MG/DL (ref 74–100)
GLUCOSE UR QL STRIP: NORMAL
GROUP & RH: NORMAL
HCT VFR BLD AUTO: 35 % (ref 37–47)
HGB BLD-MCNC: 11.9 G/DL (ref 12–16)
HGB UR QL STRIP: NEGATIVE
HOLD SPECIMEN: NORMAL
HYALINE CASTS #/AREA URNS LPF: ABNORMAL /LPF
IMM GRANULOCYTES # BLD AUTO: 0.04 X10(3)/MCL (ref 0–0.04)
IMM GRANULOCYTES NFR BLD AUTO: 0.4 %
KETONES UR QL STRIP: ABNORMAL
LEUKOCYTE ESTERASE UR QL STRIP: 250
LYMPHOCYTES # BLD AUTO: 2.39 X10(3)/MCL (ref 0.6–4.6)
LYMPHOCYTES NFR BLD AUTO: 23.2 %
MCH RBC QN AUTO: 30.7 PG (ref 27–31)
MCHC RBC AUTO-ENTMCNC: 34 G/DL (ref 33–36)
MCV RBC AUTO: 90.4 FL (ref 80–94)
MONOCYTES # BLD AUTO: 0.64 X10(3)/MCL (ref 0.1–1.3)
MONOCYTES NFR BLD AUTO: 6.2 %
MUCOUS THREADS URNS QL MICRO: ABNORMAL /LPF
NEUTROPHILS # BLD AUTO: 7.15 X10(3)/MCL (ref 2.1–9.2)
NEUTROPHILS NFR BLD AUTO: 69.6 %
NITRITE UR QL STRIP: ABNORMAL
NRBC BLD AUTO-RTO: 0 %
PH UR STRIP: 6 [PH]
PLATELET # BLD AUTO: 349 X10(3)/MCL (ref 130–400)
PMV BLD AUTO: 9.2 FL (ref 7.4–10.4)
POTASSIUM SERPL-SCNC: 3.1 MMOL/L (ref 3.5–5.1)
PROT SERPL-MCNC: 8.1 GM/DL (ref 6.4–8.3)
PROT UR QL STRIP: ABNORMAL
RBC # BLD AUTO: 3.87 X10(6)/MCL (ref 4.2–5.4)
RBC #/AREA URNS AUTO: ABNORMAL /HPF
SODIUM SERPL-SCNC: 137 MMOL/L (ref 136–145)
SP GR UR STRIP.AUTO: 1.03 (ref 1–1.03)
SQUAMOUS #/AREA URNS LPF: ABNORMAL /HPF
UROBILINOGEN UR STRIP-ACNC: ABNORMAL
WBC # BLD AUTO: 10.28 X10(3)/MCL (ref 4.5–11.5)
WBC #/AREA URNS AUTO: ABNORMAL /HPF

## 2024-10-08 PROCEDURE — 99284 EMERGENCY DEPT VISIT MOD MDM: CPT | Mod: 25

## 2024-10-08 PROCEDURE — 84702 CHORIONIC GONADOTROPIN TEST: CPT | Performed by: PHYSICIAN ASSISTANT

## 2024-10-08 PROCEDURE — 86900 BLOOD TYPING SEROLOGIC ABO: CPT | Performed by: PHYSICIAN ASSISTANT

## 2024-10-08 PROCEDURE — 86901 BLOOD TYPING SEROLOGIC RH(D): CPT | Performed by: PHYSICIAN ASSISTANT

## 2024-10-08 PROCEDURE — 87086 URINE CULTURE/COLONY COUNT: CPT | Performed by: PHYSICIAN ASSISTANT

## 2024-10-08 PROCEDURE — 80053 COMPREHEN METABOLIC PANEL: CPT | Performed by: PHYSICIAN ASSISTANT

## 2024-10-08 PROCEDURE — 87186 SC STD MICRODIL/AGAR DIL: CPT | Performed by: PHYSICIAN ASSISTANT

## 2024-10-08 PROCEDURE — 85025 COMPLETE CBC W/AUTO DIFF WBC: CPT | Performed by: PHYSICIAN ASSISTANT

## 2024-10-08 PROCEDURE — 81001 URINALYSIS AUTO W/SCOPE: CPT | Performed by: PHYSICIAN ASSISTANT

## 2024-10-08 PROCEDURE — 87077 CULTURE AEROBIC IDENTIFY: CPT | Performed by: PHYSICIAN ASSISTANT

## 2024-10-08 PROCEDURE — 81025 URINE PREGNANCY TEST: CPT | Performed by: PHYSICIAN ASSISTANT

## 2024-10-08 RX ORDER — NITROFURANTOIN 25; 75 MG/1; MG/1
100 CAPSULE ORAL 2 TIMES DAILY
Qty: 10 CAPSULE | Refills: 0 | Status: SHIPPED | OUTPATIENT
Start: 2024-10-08 | End: 2024-10-13

## 2024-10-08 NOTE — ED PROVIDER NOTES
Encounter Date: 10/8/2024       History     Chief Complaint   Patient presents with    Vaginal Bleeding     REPORTS ABOUT 7 WK PREGNANT W VAG SPOTTING YESTERDAY, OCCASIONAL CRAMPING.  LMP 24.  .       Michael Colby is a   who is approximately 7 weeks pregnant who presents to the ED due to vaginal spotting. Pt reports that yesterday she noticed a small amount of blood in her underwear. This only occurred once so she was not concerned until she noticed blood again once today. No clots. Denies abdominal pain, pelvic pain, dysuria.     First day of LMP was .    The history is provided by the patient and a significant other.     Review of patient's allergies indicates:  No Known Allergies  Past Medical History:   Diagnosis Date    Anxiety      Past Surgical History:   Procedure Laterality Date    ABDOMINAL SURGERY      TONSILLECTOMY       Family History   Problem Relation Name Age of Onset    Hypertension Mother      No Known Problems Father      Diabetes Maternal Grandmother       Social History     Tobacco Use    Smoking status: Never    Smokeless tobacco: Never   Substance Use Topics    Alcohol use: Not Currently    Drug use: Never     Review of Systems   Constitutional:  Negative for fever.   HENT:  Negative for sore throat.    Respiratory:  Negative for shortness of breath.    Cardiovascular:  Negative for chest pain.   Gastrointestinal:  Negative for nausea.   Genitourinary:  Positive for vaginal bleeding. Negative for dysuria.   Musculoskeletal:  Negative for back pain.   Skin:  Negative for rash.   Neurological:  Negative for weakness.   Hematological:  Does not bruise/bleed easily.       Physical Exam     Initial Vitals [10/08/24 1715]   BP Pulse Resp Temp SpO2   131/81 107 18 97.9 °F (36.6 °C) 100 %      MAP       --         Physical Exam    Nursing note and vitals reviewed.  Constitutional: She appears well-developed and well-nourished. No distress.   HENT:   Head: Normocephalic and  atraumatic. Mouth/Throat: No oropharyngeal exudate.   Eyes: EOM are normal. No scleral icterus.   Neck: Neck supple.   Normal range of motion.  Cardiovascular:  Normal rate and regular rhythm.           No murmur heard.  Pulmonary/Chest: Breath sounds normal. No respiratory distress. She has no wheezes.   Abdominal: Abdomen is soft. Bowel sounds are normal. She exhibits no distension. There is no abdominal tenderness.   Genitourinary:    Genitourinary Comments: Martina Sol RN present as chaperone. Scant amount of bloody discharge from cervix. No overt bleeding or clots present. No CMT or adnexal tenderness.      Musculoskeletal:         General: No tenderness. Normal range of motion.      Cervical back: Normal range of motion and neck supple.     Neurological: She is alert and oriented to person, place, and time. No cranial nerve deficit.   Skin: Skin is warm and dry. Capillary refill takes less than 2 seconds. No erythema.   Psychiatric: She has a normal mood and affect. Her behavior is normal. Judgment and thought content normal.         ED Course   Procedures  Labs Reviewed   COMPREHENSIVE METABOLIC PANEL - Abnormal       Result Value    Sodium 137      Potassium 3.1 (*)     Chloride 106      CO2 23      Glucose 110 (*)     Blood Urea Nitrogen 10.6      Creatinine 0.69      Calcium 10.4 (*)     Protein Total 8.1      Albumin 4.1      Globulin 4.0 (*)     Albumin/Globulin Ratio 1.0 (*)     Bilirubin Total 0.2      ALP 71      ALT 7      AST 13      eGFR >60      Anion Gap 8.0      BUN/Creatinine Ratio 15     HCG, QUANTITATIVE - Abnormal    Beta HCG Quant 117,420.29 (*)    CBC WITH DIFFERENTIAL - Abnormal    WBC 10.28      RBC 3.87 (*)     Hgb 11.9 (*)     Hct 35.0 (*)     MCV 90.4      MCH 30.7      MCHC 34.0      RDW 11.6      Platelet 349      MPV 9.2      Neut % 69.6      Lymph % 23.2      Mono % 6.2      Eos % 0.3      Basophil % 0.3      Lymph # 2.39      Neut # 7.15      Mono # 0.64      Eos # 0.03       Baso # 0.03      IG# 0.04      IG% 0.4      NRBC% 0.0     URINALYSIS, REFLEX TO URINE CULTURE - Abnormal    Color, UA Yellow      Appearance, UA Turbid (*)     Specific Gravity, UA 1.028      pH, UA 6.0      Protein, UA Trace (*)     Glucose, UA Normal      Ketones, UA 1+ (*)     Blood, UA Negative      Bilirubin, UA Negative      Urobilinogen, UA 1+ (*)     Nitrites, UA 1+ (*)     Leukocyte Esterase,  (*)     RBC, UA 0-5      WBC, UA 51-99 (*)     Bacteria, UA Many (*)     Squamous Epithelial Cells, UA Occ (*)     Mucous, UA Moderate (*)     Hyaline Casts, UA 0-2 (*)    POCT URINE PREGNANCY - Abnormal    POC Preg Test, Ur Positive (*)      Acceptable Yes     CULTURE, URINE   CBC W/ AUTO DIFFERENTIAL    Narrative:     The following orders were created for panel order CBC auto differential.  Procedure                               Abnormality         Status                     ---------                               -----------         ------                     CBC with Differential[[id number]]       Abnormal            Final result                 Please view results for these tests on the individual orders.   EXTRA TUBES    Narrative:     The following orders were created for panel order EXTRA TUBES.  Procedure                               Abnormality         Status                     ---------                               -----------         ------                     Red Top Hold[[id number]]                                    Final result                 Please view results for these tests on the individual orders.   RED TOP HOLD    Extra Tube Hold for add-ons.     GROUP & RH    Group & Rh A POS            Imaging Results              US OB <14 Wks TransAbd & TransVag, Single Gestation (XPD) (In process)                      Medications - No data to display  Medical Decision Making  Differential: threatened , UTI, among others    ED management: Pt abdomen soft, non-tender.  Scant bloody discharge in vaginal vault. Cervical os closed. H/H stable. A positive blood type. US with +IUP, 8 weeks, FHT 160s. UA infectious. Pt stable for discharge with macrobid x 5 days. Instructed to follow up with her OB asap. ED return precautions given. She verbalized understanding. All questions answered.     Amount and/or Complexity of Data Reviewed  Labs: ordered. Decision-making details documented in ED Course.  Radiology: ordered. Decision-making details documented in ED Course.    Risk  Prescription drug management.               ED Course as of 10/08/24 2037   Tue Oct 08, 2024   1827 Leukocyte Esterase, UA(!): 250 [KD]   1827 NITRITE UA(!): 1+ [KD]   1827 WBC, UA(!): 51-99 [KD]   1833 Group & Rh: A POS [KD]   2030  OB <14 Wks TransAbd & TransVag, Single Gestation (XPD)  +IUP, FHT 160s [KD]      ED Course User Index  [KD] Tiffanie Wilkes PA-C                           Clinical Impression:  Final diagnoses:  [O20.9] Vaginal bleeding in pregnancy, first trimester (Primary)  [N30.00] Acute cystitis without hematuria          ED Disposition Condition    Discharge Stable          ED Prescriptions       Medication Sig Dispense Start Date End Date Auth. Provider    nitrofurantoin, macrocrystal-monohydrate, (MACROBID) 100 MG capsule Take 1 capsule (100 mg total) by mouth 2 (two) times daily. for 5 days 10 capsule 10/8/2024 10/13/2024 Tiffanie Wilkes PA-C          Follow-up Information       Follow up With Specialties Details Why Contact Info    [facility] Emergency Medicine  If symptoms worsen [address] [facility] [address] [phone]    OBGYN  Schedule an appointment as soon as possible for a visit                Tiffanie Wilkes PA-C  10/08/24 2038

## 2024-10-10 LAB — BACTERIA UR CULT: ABNORMAL

## 2024-12-17 ENCOUNTER — TELEPHONE (OUTPATIENT)
Dept: MATERNAL FETAL MEDICINE | Facility: CLINIC | Age: 29
End: 2024-12-17
Payer: MEDICAID

## 2024-12-17 DIAGNOSIS — O16.2 HIGH BLOOD PRESSURE AFFECTING PREGNANCY IN SECOND TRIMESTER, ANTEPARTUM: Primary | ICD-10-CM

## 2024-12-23 DIAGNOSIS — O16.2 HIGH BLOOD PRESSURE AFFECTING PREGNANCY IN SECOND TRIMESTER, ANTEPARTUM: ICD-10-CM

## 2024-12-23 DIAGNOSIS — Z36.89 ENCOUNTER FOR FETAL ANATOMIC SURVEY: Primary | ICD-10-CM

## 2024-12-30 PROBLEM — O10.012 PRE-EXISTING ESSENTIAL HYPERTENSION AFFECTING PREGNANCY IN SECOND TRIMESTER: Status: ACTIVE | Noted: 2024-12-30

## 2024-12-30 PROBLEM — L60.0 INGROWN TOENAIL OF LEFT FOOT: Status: RESOLVED | Noted: 2022-06-13 | Resolved: 2024-12-30

## 2024-12-30 PROBLEM — E87.6 HYPOKALEMIA: Chronic | Status: RESOLVED | Noted: 2022-06-02 | Resolved: 2024-12-30

## 2024-12-30 PROBLEM — E66.3 OVERWEIGHT (BMI 25.0-29.9): Status: ACTIVE | Noted: 2024-12-30

## 2024-12-30 PROBLEM — R00.2 PALPITATIONS: Chronic | Status: RESOLVED | Noted: 2022-06-02 | Resolved: 2024-12-30

## 2024-12-30 NOTE — PROGRESS NOTES
Maternal Fetal Medicine New Consult    Subjective:     Patient ID: [id number]    Chief Complaint: MFM consult with US (LUIS.)      HPI: 29 y.o.  female  at 20w4d gestation with Estimated Date of Delivery: 25 by early US, not consistent with LMP. She is sent for MFM consultation for chronic hypertension.     She was diagnosed with chronic hypertension earlier in this pregnancy with multiple elevated BP readings prior to 20 weeks. She is not currently on any antihypertensives. Her BMI was 29 at initial OB visit.  Significant other Jazz present for visit.       She denies any personal or family history of aneuploidy or anomalies. She denies any exposure to high fevers, viral rashes, illicit drugs or alcohol in this pregnancy.  She denies any leaking fluid, vaginal bleeding, contractions, decreased fetal movement. Denies headaches, visual disturbances, or epigastric pain.       Pregnancy complications include:  Patient Active Problem List   Diagnosis    Pre-existing essential hypertension affecting pregnancy in second trimester    (BMI 25.0-29.9)    Right ovarian cyst       Past Medical History:   Diagnosis Date    Anxiety     HTN (hypertension)     Palpitations 2022       Past Surgical History:   Procedure Laterality Date    ABDOMINAL SURGERY      TONSILLECTOMY         Family History   Problem Relation Name Age of Onset    Hypertension Mother      No Known Problems Father      Diabetes Maternal Grandmother         Social History     Socioeconomic History    Marital status: Single    Number of children: 0   Tobacco Use    Smoking status: Never     Passive exposure: Never    Smokeless tobacco: Never   Substance and Sexual Activity    Alcohol use: Never    Drug use: Never    Sexual activity: Yes     Partners: Male       Current Outpatient Medications   Medication Sig Dispense Refill    PNV,calcium 72/iron/folic acid (PRENATAL VITAMIN) Tab Take 1 tablet by mouth once daily. 30 tablet 2     No current  "facility-administered medications for this visit.       Review of patient's allergies indicates:  No Known Allergies     Review of Systems   12 point review of systems conducted, negative except as stated in the history of present illness. See HPI for details.      Objective:     Visit Vitals  /88 (BP Location: Left arm, Patient Position: Sitting)   Pulse (!) 125   Ht 5' 7" (1.702 m)   Wt 91.2 kg (201 lb)   LMP 2024 (Exact Date)   BMI 31.48 kg/m²        Physical Exam  Vitals and nursing note reviewed.   Constitutional:       General: She is not in acute distress.     Appearance: Normal appearance.   HENT:      Head: Normocephalic and atraumatic.      Nose: Nose normal. No congestion.      Mouth/Throat:      Pharynx: Oropharynx is clear.   Eyes:      General: No scleral icterus.     Conjunctiva/sclera: Conjunctivae normal.   Cardiovascular:      Rate and Rhythm: Normal rate and regular rhythm.   Pulmonary:      Effort: No respiratory distress.      Breath sounds: Normal breath sounds. No wheezing.   Abdominal:      General: Abdomen is flat.      Palpations: Abdomen is soft.      Tenderness: There is no abdominal tenderness. There is no right CVA tenderness, left CVA tenderness or guarding.      Comments: No CVA tenderness gravid uterus.    Musculoskeletal:         General: Normal range of motion.      Cervical back: Neck supple.      Right lower leg: No edema.      Left lower leg: No edema.   Skin:     General: Skin is warm.      Findings: No bruising or rash.   Neurological:      General: No focal deficit present.      Mental Status: She is oriented to person, place, and time.      Deep Tendon Reflexes: Reflexes normal.      Comments: Normal reflexes   Psychiatric:         Mood and Affect: Mood normal.         Behavior: Behavior normal.         Thought Content: Thought content normal.         Judgment: Judgment normal.         Assessment/Plan:     29 y.o.  female with IUP at 20w4d    Chronic " hypertension  There is normal fetal growth and no anomalies seen on fetal anatomy scan on 25.  AFV is normal.     Chronic hypertension complicates up to 2-5% of pregnancies and is associated with significant adverse pregnancy outcomes including  birth, fetal growth restriction, fetal demise, placental abruption, and  delivery. Recent data suggest higher risk of certain congenital anomalies, including, heart defects, hypospadias and esophageal atresia. The incidence of adverse outcomes seen in pregnancies complicated by chronic hypertension is related to the degree and duration of hypertension and to the association of other organ system involvement or damage. Most pregnant women with mild chronic hypertension have uneventful pregnancies with no end-organ involvement. Comparing women who developed superimposed preeclampsia with women who did not, the incidence of  birth was 100% versus 38%, the incidence of small-for-gestational-age (SGA) infants was 78% versus 15%, and the  mortality rate was 48% versus 0%. Besides superimposed preeclampsia or eclampsia, pregnancy complicated by chronic hypertension (especially if severe) may be associated with worsening or malignant hypertension, central nervous system hemorrhage, cardiac decompensation, and renal deterioration or failure.    The age of onset, results of previous evaluation, severity and duration of hypertension, and physical examination are important determinants of which clinical tests may be useful. Ideally, a woman with chronic hypertension should be evaluated before pregnancy to ascertain potentially reversible causes and end-organ involvement (eg, heart or kidney). Baseline laboratory tests may include serum creatinine, blood urea nitrogen, electrolytes, CBC and 24-hour urine evaluation for total protein and creatinine clearance. Women who have had hypertension for several years are more likely to have cardiomegaly, ischemic  "heart disease, renal involvement, and retinopathy. In patients with severe disease over 4 years, or long standing hypertension (typically if over 30 years old), tests which may prove useful in the evaluation of these women include electrocardiography, echocardiography, ophthalmologic examination, and renal ultrasonography.     Women with paroxysmal hypertension, frequent "hypertensive crisis," seizure disorders, or anxiety attacks should be evaluated for pheochromocytoma with measurements of 24-hour urine vanillylmandelic acid, metanephrines, or unconjugated catecholamines. Magnetic resonance imaging after the first trimester or computed tomography may be useful for adrenal tumor localization. Renal artery stenosis appears to be more prevalent in patients with type-2 diabetes and coexistent hypertension. Doppler flow studies or magnetic resonance angiography are helpful to detect renal artery stenosis. Negative results from renal ultrasonography do not exclude renal artery stenosis.     In women with chronic hypertension, it can be difficult to discern worsening hypertension that might occur as a result of physiological changes of pregnancy in the third trimester from the development of preeclampsia. The use of certain laboratory tests such as serum creatinine, liver functions tests, hematocrit and platelets to compare to baseline values from early in pregnancy might serve to delineate these conditions. Routine screening of women with chronic hypertension with these laboratory tests is not routinely indicated.    I have shared with her the normal natural course of chronic hypertension in pregnancy with improvement early on and likely increasing blood pressure as the pregnancy advances. Based on findings of recent CHAP Study, it is recommended to utilize 140/90 as the threshold for initiation or titration of medical therapy for chronic hypertension in pregnancy, rather than the previously recommended threshold of " 160/110. For patients on blood pressure medications at the start of pregnancy, in the absence of mitigating factors or side effects, they can be maintained on their medications, rather than discontinuing them and waiting to initiate treatment for blood pressures in the severe range. Commonly used medications include nifedipine and labetalol.    Vitals:    25 1253   BP: 135/88    With this blood pressure, she was advised to follow low sodium diet with no antihypertensives at this time.      Use aspirin as discussed.    She was advised of the higher risk of fetal growth restriction and superimposed preeclampsia with her underlying chronic hypertension. The risk of placental abruption was also discussed. No prevention is available with her reporting any bleeding or cramping. She was also advised to report any headaches, visual problems, epigastric pain.    There is no consensus as to the most appropriate fetal surveillance test(s) or the interval and timing of testing in  with chronic hypertension. Thus, such testing should be individualized and based on clinical judgment and on severity of disease.    We will plan to do follow-up ultrasounds to monitor growth around 24-26 weeks and then every 4 weeks until the end of pregnancy. Recommend fetal testing starting around 32 weeks gestation until the end of pregnancy    Among patients with uncomplicated chronic hypertension with no additional risk factors, delivery at 38-39 weeks appears to provide optimal balance between the risk of adverse fetal and  outcomes. If no medication and normal fetal assessment, recommend delivery at 39 weeks. However, will reassess closer to EDC to determine optimal timeframe or GA for delivery, based on current evaluation at that time.    Recommend close followup after delivery with Primary OB as well as PCP for ongoing evaluation/treatment for HTN.        BMI now greater than 30  Body mass index is 31.48 kg/m².    She  was advised of the importance of eating healthy and and limiting weight gain to 15-25lbs during the pregnancy, as optimal in this situation. Discussed the importance of losing weight after this pregnancy, especially before attempting future pregnancy. Breastfeeding may be an important tool in reducing the postpartum weight retention. Excess weight gain would be associated with worsening hypertension, gestational diabetes and adverse  outcomes, including fetal demise in utero.      Preeclampsia prophylaxis  With her risk factors for preeclampsia including chronic hypertension , nulliparity, BMI over 30,  ancestry, and low socioeconomic status, discussed recommendations for low dose aspirin use to decrease risks for adverse outcomes, including preeclampsia, low birth weight and  delivery. Low-dose aspirin reduced the risk for preeclampsia by 15% in clinical trials and reduced the risk for  birth by 20% and FGR by 20%, and  mortality by around 20%.  After discussing risks/benefits of its use, it was agreed to start asa 81 mg BID, due to multiple risk factors for preeclampsia. After discussing benefits (more effective than daily) vs potential risks (less data on safety with use at delivery), she agreed to start low dose aspirin twice daily and continue until 34 6/7weeks, then decrease to once daily until delivery.. Also, recommend using in all future pregnancies starting at 12 weeks.  Preeclampsia precautions given.    Patient was counseled of the management of chronic hypertension in pregnancy.  Agreed to use aspirin b.i.d. after counseling on daily versus twice a day.  Low-salt diet for now with no antihypertensive medication.  Importance of limiting weight gain going forward discussed.  Plan to see her again in about a month try to complete anatomy scan.  Incidental finding with simple cyst is a benign finding with no further evaluation needed.  We will recheck again      Follow  up in about 4 weeks (around 1/30/2025) for MFM Followup, Repeat Ultrasound, to complete anatomy.     No future appointments.       Patient was evaluated by Keerthi Pedraza, ASHLEY, and and Dr. Larsen.  Final assessment and recommendations as stated above were made by Dr. Larsen.    This note was created with the assistance of [employer] voice recognition software. There may be transcription errors as a result of using this technology, however minimal. Effort has been made to ensure accuracy of transcription, but any obvious errors or omissions should be clarified with the author of the document.

## 2025-01-02 ENCOUNTER — PROCEDURE VISIT (OUTPATIENT)
Dept: MATERNAL FETAL MEDICINE | Facility: CLINIC | Age: 30
End: 2025-01-02
Payer: MEDICAID

## 2025-01-02 ENCOUNTER — OFFICE VISIT (OUTPATIENT)
Dept: MATERNAL FETAL MEDICINE | Facility: CLINIC | Age: 30
End: 2025-01-02
Payer: MEDICAID

## 2025-01-02 VITALS
WEIGHT: 201 LBS | BODY MASS INDEX: 31.55 KG/M2 | HEIGHT: 67 IN | DIASTOLIC BLOOD PRESSURE: 88 MMHG | HEART RATE: 125 BPM | SYSTOLIC BLOOD PRESSURE: 135 MMHG

## 2025-01-02 DIAGNOSIS — E66.3 OVERWEIGHT (BMI 25.0-29.9): ICD-10-CM

## 2025-01-02 DIAGNOSIS — N83.201 RIGHT OVARIAN CYST: ICD-10-CM

## 2025-01-02 DIAGNOSIS — O10.012 PRE-EXISTING ESSENTIAL HYPERTENSION AFFECTING PREGNANCY IN SECOND TRIMESTER: Primary | ICD-10-CM

## 2025-01-02 DIAGNOSIS — O16.2 HIGH BLOOD PRESSURE AFFECTING PREGNANCY IN SECOND TRIMESTER, ANTEPARTUM: ICD-10-CM

## 2025-01-02 DIAGNOSIS — Z36.89 ENCOUNTER FOR FETAL ANATOMIC SURVEY: ICD-10-CM

## 2025-01-02 RX ORDER — ASPIRIN 81 MG/1
81 TABLET ORAL 2 TIMES DAILY
Qty: 60 TABLET | Refills: 3 | Status: SHIPPED | OUTPATIENT
Start: 2025-01-02 | End: 2025-05-02

## 2025-01-15 DIAGNOSIS — O10.012 PRE-EXISTING ESSENTIAL HYPERTENSION AFFECTING PREGNANCY IN SECOND TRIMESTER: Primary | ICD-10-CM

## 2025-01-15 DIAGNOSIS — Z36.2 ENCOUNTER FOR FOLLOW-UP ULTRASOUND OF FETAL ANATOMY: ICD-10-CM

## 2025-01-28 NOTE — PROGRESS NOTES
"  Maternal Fetal Medicine Follow Up    Subjective:     Patient ID: [id number]    Chief Complaint: M follow up w/us       HPI: Michael Colby is a 29 y.o. female  at 24w4d gestation with Estimated Date of Delivery: 25  who is here for follow-up consultation by M.    She was diagnosed with chronic hypertension earlier in this pregnancy with multiple elevated BP readings prior to 20 weeks. She is not currently on any antihypertensives.  On 2025, she had baseline 24 hour urine with 149 mg protein. She is on low-dose aspirin twice daily for preeclampsia prophylaxis.  Her BMI was 29 at initial OB visit.  She had a small maternal right ovarian cyst seen on consult ultrasound.       Interval history since last MFM visit: None.. She denies any leaking fluid, vaginal bleeding, contractions, decreased fetal movement. Denies headaches, visual disturbances, or epigastric pain.    Pregnancy complications include:   Patient Active Problem List   Diagnosis    Pre-existing essential hypertension affecting pregnancy in second trimester    (BMI 25.0-29.9)    Right ovarian cyst       No changes to medical, surgical, family, social, or obstetric history.    Medications:  Current Outpatient Medications   Medication Instructions    aspirin (ECOTRIN) 81 mg, Oral, 2 times daily, Start after 12 weeks gestation.  At 35 weeks gestation, decrease to one 81mg tablet daily until delivery.    labetaloL (NORMODYNE) 100 mg, Oral, Every 12 hours    PNV,calcium 72/iron/folic acid (PRENATAL VITAMIN) Tab 1 tablet, Oral, Daily       Review of Systems   12 point review of systems conducted, negative except as stated in the history of present illness. See HPI for details.      Objective:     Visit Vitals  BP (!) 139/90 (BP Location: Left arm, Patient Position: Sitting)   Pulse (!) 128   Ht 5' 7" (1.702 m)   Wt 92.5 kg (204 lb)   LMP 2024 (Exact Date)   BMI 31.95 kg/m²        Physical Exam  Vitals and nursing note reviewed. "   Constitutional:       Appearance: Normal appearance.      Comments: Increased BMI   HENT:      Head: Normocephalic and atraumatic.      Nose: Nose normal. No congestion.   Cardiovascular:      Rate and Rhythm: Normal rate.   Pulmonary:      Effort: Pulmonary effort is normal.   Skin:     Findings: No rash.   Neurological:      Mental Status: She is alert and oriented to person, place, and time.   Psychiatric:         Mood and Affect: Mood normal.         Behavior: Behavior normal.         Thought Content: Thought content normal.         Judgment: Judgment normal.         Assessment/Plan:     29 y.o.  female with IUP at 24w4d    Chronic hypertension  There is normal fetal growth with an EFW of 733 g at the 50% and the AC at the 22% on 25.  AFV is normal.     Chronic hypertension is associated with significant adverse pregnancy outcomes including  birth, fetal growth restriction, fetal demise, placental abruption, and  delivery. Based on findings of recent CHAP Study, it is recommended to utilize 140/90 as the threshold for initiation or titration of medical therapy for chronic hypertension in pregnancy, rather than the previously recommended threshold of 160/110. For patients on blood pressure medications at the start of pregnancy, in the absence of mitigating factors or side effects, they can be maintained on their medications, rather than discontinuing them and waiting to initiate treatment for blood pressures in the severe range.    Vitals:    25 1122 25 1124   BP: (!) 149/91 (!) 139/90     With this BP, she was advised to continue low salt diet, and start labetalol at 100 mg q.12 hours .     Low dose aspirin as discussed.    She would benefit from follow-up ultrasounds to monitor growth every 4 weeks until the end of pregnancy. Recommend fetal testing starting around 32 weeks gestation until the end of pregnancy.    Among patients with uncomplicated chronic hypertension with  no additional risk factors, delivery at 38-39 weeks appears to provide optimal balance between the risk of adverse fetal and  outcomes. If no medication and normal fetal assessment, recommend delivery at 39 weeks. However, will reassess closer to EDC to determine optimal timeframe or GA for delivery, based on current evaluation at that time.     Recommend close postpartum followup with Primary OB as well as PCP for ongoing evaluation/treatment of HTN.        BMI now greater than 30  Reasonable gain since last visit.  She was advised of the importance of eating healthy and and limiting weight gain to 15-25lbs during the pregnancy, as optimal in this situation. Discussed the importance of losing weight after this pregnancy, especially before attempting future pregnancy. Breastfeeding may be an important tool in reducing the postpartum weight retention. Excess weight gain would be associated with worsening hypertension, gestational diabetes and adverse  outcomes, including fetal demise in utero.      Preeclampsia prophylaxis  She was advised to continue asa 81 mg BID until 34 6/7 weeks, then decrease to once daily until delivery. Preeclampsia precautions reviewed.       Blood pressure is not controlled now with a low-salt diet.  Advised her to continue aspirin b.i.d. and started labetalol 100 mg q.12 hours.  Fetal kick count instructions and preeclampsia warnings.  There is normal fetal growth at this time    Follow up in about 4 weeks (around 2025) for MFM follow-up, Repeat ultrasound.     Future Appointments   Date Time Provider Department Center   2025  9:30 AM ROOM 1, [facility]   2025 10:00 AM Bennie Larsen MD [facility]        BRENDA involvement: Patient was evaluated and examined by Dr. Larsen. ANDRESSA Bianchi, helped in pre charting of part of note.    This note was created with the assistance of [employer] voice recognition software. There may be  transcription errors as a result of using this technology, however minimal. Effort has been made to ensure accuracy of transcription, but any obvious errors or omissions should be clarified with the author of the document.

## 2025-01-30 ENCOUNTER — OFFICE VISIT (OUTPATIENT)
Dept: MATERNAL FETAL MEDICINE | Facility: CLINIC | Age: 30
End: 2025-01-30
Payer: MEDICAID

## 2025-01-30 ENCOUNTER — PROCEDURE VISIT (OUTPATIENT)
Dept: MATERNAL FETAL MEDICINE | Facility: CLINIC | Age: 30
End: 2025-01-30
Payer: MEDICAID

## 2025-01-30 VITALS
HEIGHT: 67 IN | BODY MASS INDEX: 32.02 KG/M2 | HEART RATE: 128 BPM | DIASTOLIC BLOOD PRESSURE: 90 MMHG | SYSTOLIC BLOOD PRESSURE: 139 MMHG | WEIGHT: 204 LBS

## 2025-01-30 DIAGNOSIS — O10.012 PRE-EXISTING ESSENTIAL HYPERTENSION AFFECTING PREGNANCY IN SECOND TRIMESTER: ICD-10-CM

## 2025-01-30 DIAGNOSIS — O10.012 PRE-EXISTING ESSENTIAL HYPERTENSION AFFECTING PREGNANCY IN SECOND TRIMESTER: Primary | ICD-10-CM

## 2025-01-30 DIAGNOSIS — E66.3 OVERWEIGHT (BMI 25.0-29.9): ICD-10-CM

## 2025-01-30 DIAGNOSIS — N83.201 RIGHT OVARIAN CYST: ICD-10-CM

## 2025-01-30 DIAGNOSIS — Z36.2 ENCOUNTER FOR FOLLOW-UP ULTRASOUND OF FETAL ANATOMY: ICD-10-CM

## 2025-01-30 PROCEDURE — 1160F RVW MEDS BY RX/DR IN RCRD: CPT | Mod: CPTII,S$GLB,, | Performed by: OBSTETRICS & GYNECOLOGY

## 2025-01-30 PROCEDURE — 1159F MED LIST DOCD IN RCRD: CPT | Mod: CPTII,S$GLB,, | Performed by: OBSTETRICS & GYNECOLOGY

## 2025-01-30 PROCEDURE — 76816 OB US FOLLOW-UP PER FETUS: CPT | Mod: S$GLB,,, | Performed by: OBSTETRICS & GYNECOLOGY

## 2025-01-30 PROCEDURE — 3008F BODY MASS INDEX DOCD: CPT | Mod: CPTII,S$GLB,, | Performed by: OBSTETRICS & GYNECOLOGY

## 2025-01-30 PROCEDURE — 3080F DIAST BP >= 90 MM HG: CPT | Mod: CPTII,S$GLB,, | Performed by: OBSTETRICS & GYNECOLOGY

## 2025-01-30 PROCEDURE — 3075F SYST BP GE 130 - 139MM HG: CPT | Mod: CPTII,S$GLB,, | Performed by: OBSTETRICS & GYNECOLOGY

## 2025-01-30 PROCEDURE — 99214 OFFICE O/P EST MOD 30 MIN: CPT | Mod: TH,S$GLB,, | Performed by: OBSTETRICS & GYNECOLOGY

## 2025-01-30 RX ORDER — LABETALOL 100 MG/1
100 TABLET, FILM COATED ORAL EVERY 12 HOURS
Qty: 60 TABLET | Refills: 2 | Status: SHIPPED | OUTPATIENT
Start: 2025-01-30

## 2025-02-11 DIAGNOSIS — O10.012 PRE-EXISTING ESSENTIAL HYPERTENSION AFFECTING PREGNANCY IN SECOND TRIMESTER: Primary | ICD-10-CM

## 2025-02-20 NOTE — PROGRESS NOTES
Maternal Fetal Medicine Follow Up    Subjective:     Patient ID: [id number]    Chief Complaint: M follow up with US      HPI: Michael Colby is a 29 y.o. female  at 28w1d gestation with Estimated Date of Delivery: 25  who is here for follow-up consultation by M.    She was diagnosed with chronic hypertension earlier in this pregnancy with multiple elevated BP readings prior to 20 weeks. She is currently supposed on labetalol 100 mg q.12 hours (started 2025).  However, patient stated that she has not be taking the labetalol with the readings at home but anywhere between 120s to 140s, afraid that that dose is too high as she has an aunt that takes a 5 mg tablet of a different medication.  On 2025, she had baseline 24 hour urine with 149 mg protein. She is on low-dose aspirin twice daily for preeclampsia prophylaxis.  Her BMI was 29 at initial OB visit.  She had a small maternal right ovarian cyst seen on consult ultrasound.       Interval history since last M visit: None.. She denies any leaking fluid, vaginal bleeding, contractions, decreased fetal movement. Denies headaches, visual disturbances, or epigastric pain.    Pregnancy complications include:   Patient Active Problem List   Diagnosis    Pre-existing essential hypertension affecting pregnancy in second trimester    (BMI 25.0-29.9)    Right ovarian cyst       No changes to medical, surgical, family, social, or obstetric history.    Medications:  Current Outpatient Medications   Medication Instructions    aspirin (ECOTRIN) 81 mg, Oral, 2 times daily, Start after 12 weeks gestation.  At 35 weeks gestation, decrease to one 81mg tablet daily until delivery.    labetaloL (NORMODYNE) 100 mg, Oral, Every 12 hours    PNV,calcium 72/iron/folic acid (PRENATAL VITAMIN) Tab 1 tablet, Oral, Daily       Review of Systems   12 point review of systems conducted, negative except as stated in the history of present illness. See HPI for  "details.      Objective:     Visit Vitals  BP (!) 149/100 (BP Location: Left arm, Patient Position: Sitting)   Pulse (!) 130   Ht 5' 7" (1.702 m)   Wt 97.1 kg (214 lb)   LMP 2024 (Exact Date)   BMI 33.52 kg/m²        Physical Exam  Vitals and nursing note reviewed.   Constitutional:       Appearance: Normal appearance.      Comments: Increased BMI   HENT:      Head: Normocephalic and atraumatic.      Nose: Nose normal. No congestion.   Cardiovascular:      Rate and Rhythm: Normal rate.   Pulmonary:      Effort: Pulmonary effort is normal.   Skin:     Findings: No rash.   Neurological:      Mental Status: She is alert and oriented to person, place, and time.   Psychiatric:         Mood and Affect: Mood normal.         Behavior: Behavior normal.         Thought Content: Thought content normal.         Judgment: Judgment normal.         Assessment/Plan:     29 y.o.  female with IUP at 28w1d    Chronic hypertension  There is normal fetal growth with an EFW of 1202 g at the 47% and the AC at the 28% on 25.  AFV is normal.      Chronic hypertension is associated with significant adverse pregnancy outcomes including  birth, fetal growth restriction, fetal demise, placental abruption, and  delivery. Based on findings of recent CHAP Study, it is recommended to utilize 140/90 as the threshold for initiation or titration of medical therapy for chronic hypertension in pregnancy, rather than the previously recommended threshold of 160/110. For patients on blood pressure medications at the start of pregnancy, in the absence of mitigating factors or side effects, they can be maintained on their medications, rather than discontinuing them and waiting to initiate treatment for blood pressures in the severe range.    Vitals:    25 0923 25 0927 25 1025   BP: (!) 145/95 (!) 151/100 (!) 149/100     With this BP, I also tested patient is on machine that read the same reading here, advised " her to start taking  labetalol 100 mg q.12 hours.  Advised her to let me know if the blood pressure is persistently over 145 or if it gets under 100 systolic.  Patient has a follow-up next week with Dr. Goldberg where her blood pressure will be rechecked again in the office.    Low dose aspirin as discussed.    She would benefit from follow-up ultrasounds to monitor growth every 4 weeks until the end of pregnancy. Recommend fetal testing starting around 32 weeks gestation until the end of pregnancy.    Among patients with uncomplicated chronic hypertension with no additional risk factors, delivery at 38-39 weeks appears to provide optimal balance between the risk of adverse fetal and  outcomes. However, will reassess closer to EDC to determine optimal timeframe or GA for delivery, based on current evaluation at that time.     Recommend close postpartum followup with Primary OB as well as PCP for ongoing evaluation/treatment of HTN.        BMI now greater than 30  Body mass index is 33.52 kg/m². With excessive weight gain from last visit, 10 lbs in 1 month , she was advised to decrease caloric intake and was reminded of the importance of avoiding excessive weight gain.  Excess weight gain would be associated with worsening hypertension, gestational diabetes and adverse  outcomes, including fetal demise in utero.    Reviewed importance of FKC 3/day and prn with instructions to immediately report any decreased fetal movement.    It is important to lose weight after the pregnancy is over, especially before a future pregnancy. Breastfeeding may be an important tool in reducing the postpartum weight retention. Fetal risks were discussed with short term risk of fetal/ obesity and long term risk of adolescent component of metabolic syndrome.      Preeclampsia prophylaxis  She was advised to continue asa 81 mg BID until 34 6/7 weeks, then decrease to once daily until delivery. Preeclampsia  precautions reviewed.     Emphasized with the patient the importance of starting medication and that the 100 mg q.12 hours is a lowest dose of labetalol with the patient to keep monitoring her blood pressures at home.  Continue aspirin b.i.d..  From excessive weight gain discussed and advised her to decrease caloric intake.    Follow up in about 4 weeks (around 3/24/2025) for MFM Followup, Repeat Ultrasound, BPP, MFM follow-up, Thursday.     Future Appointments   Date Time Provider Department Center   3/24/2025 10:00 AM Bennie Larsen MD [facility]   3/24/2025 10:00 AM ROOM 2, [facility]       BRENDA involvement: Patient was evaluated and examined by Dr. Larsen. ANDRESSA Bianchi, helped in pre charting of part of note.    This note was created with the assistance of [employer] voice recognition software. There may be transcription errors as a result of using this technology, however minimal. Effort has been made to ensure accuracy of transcription, but any obvious errors or omissions should be clarified with the author of the document.

## 2025-02-24 ENCOUNTER — OFFICE VISIT (OUTPATIENT)
Dept: MATERNAL FETAL MEDICINE | Facility: CLINIC | Age: 30
End: 2025-02-24
Payer: MEDICAID

## 2025-02-24 ENCOUNTER — PROCEDURE VISIT (OUTPATIENT)
Dept: MATERNAL FETAL MEDICINE | Facility: CLINIC | Age: 30
End: 2025-02-24
Payer: MEDICAID

## 2025-02-24 VITALS
HEART RATE: 130 BPM | SYSTOLIC BLOOD PRESSURE: 149 MMHG | WEIGHT: 214 LBS | DIASTOLIC BLOOD PRESSURE: 100 MMHG | BODY MASS INDEX: 33.59 KG/M2 | HEIGHT: 67 IN

## 2025-02-24 DIAGNOSIS — O10.012 PRE-EXISTING ESSENTIAL HYPERTENSION AFFECTING PREGNANCY IN SECOND TRIMESTER: ICD-10-CM

## 2025-02-24 DIAGNOSIS — O10.012 PRE-EXISTING ESSENTIAL HYPERTENSION AFFECTING PREGNANCY IN SECOND TRIMESTER: Primary | ICD-10-CM

## 2025-02-24 DIAGNOSIS — N83.201 RIGHT OVARIAN CYST: ICD-10-CM

## 2025-02-24 DIAGNOSIS — E66.3 OVERWEIGHT (BMI 25.0-29.9): ICD-10-CM

## 2025-02-24 PROCEDURE — 3080F DIAST BP >= 90 MM HG: CPT | Mod: CPTII,S$GLB,, | Performed by: OBSTETRICS & GYNECOLOGY

## 2025-02-24 PROCEDURE — 99214 OFFICE O/P EST MOD 30 MIN: CPT | Mod: TH,S$GLB,, | Performed by: OBSTETRICS & GYNECOLOGY

## 2025-02-24 PROCEDURE — 1160F RVW MEDS BY RX/DR IN RCRD: CPT | Mod: CPTII,S$GLB,, | Performed by: OBSTETRICS & GYNECOLOGY

## 2025-02-24 PROCEDURE — 76816 OB US FOLLOW-UP PER FETUS: CPT | Mod: S$GLB,,, | Performed by: OBSTETRICS & GYNECOLOGY

## 2025-02-24 PROCEDURE — 1159F MED LIST DOCD IN RCRD: CPT | Mod: CPTII,S$GLB,, | Performed by: OBSTETRICS & GYNECOLOGY

## 2025-02-24 PROCEDURE — 3077F SYST BP >= 140 MM HG: CPT | Mod: CPTII,S$GLB,, | Performed by: OBSTETRICS & GYNECOLOGY

## 2025-02-24 PROCEDURE — 3008F BODY MASS INDEX DOCD: CPT | Mod: CPTII,S$GLB,, | Performed by: OBSTETRICS & GYNECOLOGY

## 2025-03-10 DIAGNOSIS — O10.012 PRE-EXISTING ESSENTIAL HYPERTENSION AFFECTING PREGNANCY IN SECOND TRIMESTER: Primary | ICD-10-CM

## 2025-03-21 NOTE — PROGRESS NOTES
Maternal Fetal Medicine Follow Up    Subjective:     Patient ID: [id number]    Chief Complaint: MFM follow up with US (CHTN, GDM newly diagnosed.)      HPI: Michael Colby is a 30 y.o. female  at 32w1d gestation with Estimated Date of Delivery: 25  who is here for follow-up consultation by MFM.    She was diagnosed with chronic hypertension earlier in this pregnancy with multiple elevated BP readings prior to 20 weeks.  She is supposed to be on labetalol 100 mg every 12 hours.  She reported on last visit that she was not taking her blood pressure medication and reports again today, 2025 that she is not taking her blood pressure medication as she is having normal blood pressures at home.  She brought a blood pressure log for review with values 120//89.  She also checks her pulse at home which has ranged from .  She reports that she gets extremely anxious at the doctor's visits and blood pressure is usually elevated but returns to normal at the end of the visit.  On 2025, she had baseline 24 hour urine with 149 mg protein. She is on low-dose aspirin twice daily for preeclampsia prophylaxis.  Her BMI was 29 at initial OB visit.  She had a small maternal right ovarian cyst seen on consult ultrasound.  She was recently diagnosed with gestational diabetes.  She is on diabetic diet and checking her blood glucose.  She brought a log for review today.       Interval history since last MFM visit: None.. She denies any leaking fluid, vaginal bleeding, contractions, decreased fetal movement. Denies headaches, visual disturbances, or epigastric pain.    Pregnancy complications include:   Patient Active Problem List   Diagnosis    Pre-existing essential hypertension affecting pregnancy in second trimester    (BMI 25.0-29.9)    Right ovarian cyst    Diet controlled gestational diabetes mellitus (GDM) in third trimester       No changes to medical, surgical, family, social, or obstetric  "history.    Medications:  Current Outpatient Medications   Medication Instructions    aspirin (ECOTRIN) 81 mg, Oral, 2 times daily, Start after 12 weeks gestation.  At 35 weeks gestation, decrease to one 81mg tablet daily until delivery.    labetaloL (NORMODYNE) 100 mg, Oral, Every 12 hours    PNV,calcium 72/iron/folic acid (PRENATAL VITAMIN) Tab 1 tablet, Oral, Daily    TRUE METRIX AIR GLUCOSE METER Misc USE TO CHECK GLUCOSE 4 TIMES DAILY (IN AM FASTING, THEN 1 HOUR AFTER EACH MEAL)    TRUE METRIX GLUCOSE TEST STRIP Strp SMARTSIG:Via Meter    TRUEPLUS LANCETS 33 gauge Misc Apply topically.       Review of Systems   12 point review of systems conducted, negative except as stated in the history of present illness. See HPI for details.      Objective:     Visit Vitals  BP (!) 140/86   Pulse 108   Ht 5' 7" (1.702 m)   Wt 97.1 kg (214 lb)   LMP 2024 (Exact Date)   BMI 33.52 kg/m²        Physical Exam  Vitals and nursing note reviewed.   Constitutional:       Appearance: Normal appearance.      Comments: Increased BMI   HENT:      Head: Normocephalic and atraumatic.      Nose: Nose normal. No congestion.   Cardiovascular:      Rate and Rhythm: Normal rate.   Pulmonary:      Effort: Pulmonary effort is normal.   Skin:     Findings: No rash.   Neurological:      Mental Status: She is alert and oriented to person, place, and time.   Psychiatric:         Mood and Affect: Mood normal.         Behavior: Behavior normal.         Thought Content: Thought content normal.         Judgment: Judgment normal.         Assessment/Plan:     30 y.o.  female with IUP at 32w1d    Chronic hypertension  There is normal fetal growth with an EFW of 1897 g at the 27% and the AC at the 38% on 3/24/25  AFV is normal.  Biophysical profile  on today, 2025       Chronic hypertension is associated with significant adverse pregnancy outcomes including  birth, fetal growth restriction, fetal demise, placental abruption, and "  delivery. Based on findings of recent CHAP Study, it is recommended to utilize 140/90 as the threshold for initiation or titration of medical therapy for chronic hypertension in pregnancy, rather than the previously recommended threshold of 160/110. For patients on blood pressure medications at the start of pregnancy, in the absence of mitigating factors or side effects, they can be maintained on their medications, rather than discontinuing them and waiting to initiate treatment for blood pressures in the severe range.    Vitals:    25 1009 25 1013 25 1057   BP: (!) 159/105 (!) 160/104 (!) 140/86       With this BP, and the lability of the blood pressures with the patient is not totally comfortable, agreed to start  labetalol 100 mg every 12 hours      Low dose aspirin as discussed.    She would benefit from follow-up ultrasounds to monitor growth every 4 weeks until the end of pregnancy. Recommend twice weekly fetal testing, alternating with primary OB, until delivery.  Reviewed fetal kick count instructions.    Among patients with uncomplicated chronic hypertension with no additional risk factors, delivery at 38-39 weeks appears to provide optimal balance between the risk of adverse fetal and  outcomes. However, will reassess closer to EDC to determine optimal timeframe or GA for delivery, based on current evaluation at that time.     Recommend close postpartum followup with Primary OB as well as PCP for ongoing evaluation/treatment of HTN.        Gestational diabetes  The incidence of diabetes in pregnancy is 6-7%, and about 85% represent GDM. I discussed with her risks associated with diabetes in pregnancy including higher risk for polyhydramnios, fetal macrosomia, lower Apgar scores and  metabolic complications (hypoglycemia, hyperbilirubinemia, hypocalcemia, erythema) and developing preeclampsia    Currently, she was advised that she needs to be on 2200 calorie  diabetic diet.It is recommended that she have 30-45 grams of carbohydrates with breakfast, a mid-morning snack with 15-30 grams of carbohydrates, 45-60 grams of carbohydrates with lunch, a mid-afternoon snack with 15-30 grams of carbohydrates, 45-60 grams of carbohydrates with dinner, and a bedtime snack with 15-30 grams of carbohydrates. The importance of avoiding any significant weight gain till the end of the pregnancy was discussed.  She will be monitoring her sugars at home.  If the 1 hour postprandial greater than 140 and pre-prandial blood sugar greater than 90, then she will let me know.        I have shared with her the options of treatment including insulin treatment which is the gold standard of care for diabetes in pregnancy versus a recent use of alternatives, such as glyburide or metformin (both crosses the placenta). Although, glyburide has been used over the past 10 years as primary alternative to insulin, a recent meta-analysis (2015) suggested that metformin should be the alternative to insulin and not glyburide. The conclusion of the study showed a higher birth weight (100 g) associated with glyburide compared to insulin, two fold higher risk of  hypoglycemia, and more than 2x higher risk of macrosomia associated with glyburide use. This difference is likely to be secondary to hyperinsulinism in fetus secondary to fetal exposure to glyburide.  Metformin, also had lower maternal weight gain, lower birth rate and less risk of macrosomia when compared with glyburide. When compared to insulin, Metformin had lower maternal weight gain, increase in  birth and lower gestational age at delivery and lower incidence of gestational hypertension. There was a trend for less  hypoglycemia and high failure rate of 30-35%, when compared to insulin.  In addition, the lack of long term data on glyburide and metformin use regarding safety was addressed and recommended use of Insulin for  treatment, which is the gold standard, with excellent efficiency and safety, albeit more inconvenience.  Questions were answered.     Log reviewed. With normal values, she was advised to continue diabetic diet and no additional treatment is needed at this time.       She was advised to check glucose 2 times a day at alternating times of the day and bring log to each appointment.    Fetal surveillance as above.    The association of gestational diabetes (50%) with adult-onset type 2 diabetes mellitus was reviewed.  She was advised of importance of losing weight after the pregnancy, as well as doing a 75g 2hr glucose tolerance test after postpartum exam, and checkups every 1-3 years for blood sugars to make sure she does not develop diabetes.         BMI greater than 30  Body mass index is 33.52 kg/m². With stable weight gain from last month, she was advised to continue healthy, low-sodium diet avoiding any excessive weight gain. Excess weight gain would be associated with worsening hypertension, worsening gestational diabetes and adverse  outcomes, including fetal demise in utero.    Reviewed importance of FKC 3/day and prn with instructions to immediately report any decreased fetal movement.    It is important to lose weight after the pregnancy is over, especially before a future pregnancy. Breastfeeding may be an important tool in reducing the postpartum weight retention. Fetal risks were discussed with short term risk of fetal/ obesity and long term risk of adolescent component of metabolic syndrome.      Preeclampsia prophylaxis  She was advised to continue asa 81 mg BID until 34 6/7 weeks, then decrease to once daily until delivery. Preeclampsia precautions reviewed.       Patient is very anxious.  Her pulse was very high initially 143 repeated was 122 and I rechecked it myself was 108.  Same thing with the blood pressures.  Tried to reassure her get her at ease for the next follow-up visits to  get more normal blood pressure she has no symptoms.  There is no evidence of preeclampsia.  However with this significant lability in blood pressure with a any perceived stressful environment, patient probably fluctuates her blood pressures at different times agreed to start the labetalol at this time.  Counseled on diabetes management during pregnancy.  She only have a scattered elevation related to diet.  No treatment recommended at this time.    Follow up in about 1 week (around 3/31/2025) for MFM Followup, BPP.     Future Appointments   Date Time Provider Department Center   4/3/2025  8:45 AM ROOM 3, [facility]   4/3/2025  9:00 AM Bennie Larsen MD [facility]   4/10/2025  9:45 AM ROOM 3, [facility] [place]   4/10/2025 10:00 AM Bennie Larsen MD [facility]         BRENDA involvement: Patient was evaluated and examined by Dr. Larsen. ASHLEY Wagoner, helped in pre charting of part of note.    This note was created with the assistance of [employer] voice recognition software. There may be transcription errors as a result of using this technology, however minimal. Effort has been made to ensure accuracy of transcription, but any obvious errors or omissions should be clarified with the author of the document.

## 2025-03-24 ENCOUNTER — OFFICE VISIT (OUTPATIENT)
Dept: MATERNAL FETAL MEDICINE | Facility: CLINIC | Age: 30
End: 2025-03-24
Payer: MEDICAID

## 2025-03-24 ENCOUNTER — PROCEDURE VISIT (OUTPATIENT)
Dept: MATERNAL FETAL MEDICINE | Facility: CLINIC | Age: 30
End: 2025-03-24
Payer: MEDICAID

## 2025-03-24 VITALS
HEIGHT: 67 IN | BODY MASS INDEX: 33.59 KG/M2 | DIASTOLIC BLOOD PRESSURE: 86 MMHG | WEIGHT: 214 LBS | SYSTOLIC BLOOD PRESSURE: 140 MMHG | HEART RATE: 108 BPM

## 2025-03-24 DIAGNOSIS — O24.410 DIET CONTROLLED GESTATIONAL DIABETES MELLITUS (GDM) IN THIRD TRIMESTER: ICD-10-CM

## 2025-03-24 DIAGNOSIS — O10.012 PRE-EXISTING ESSENTIAL HYPERTENSION AFFECTING PREGNANCY IN SECOND TRIMESTER: Primary | ICD-10-CM

## 2025-03-24 DIAGNOSIS — O24.410 GESTATIONAL DIABETES MELLITUS, CLASS A1: ICD-10-CM

## 2025-03-24 DIAGNOSIS — O10.012 PRE-EXISTING ESSENTIAL HYPERTENSION AFFECTING PREGNANCY IN SECOND TRIMESTER: ICD-10-CM

## 2025-03-24 DIAGNOSIS — N83.201 RIGHT OVARIAN CYST: ICD-10-CM

## 2025-03-24 DIAGNOSIS — E66.3 OVERWEIGHT (BMI 25.0-29.9): ICD-10-CM

## 2025-03-24 LAB — GLUCOSE SERPL-MCNC: 85 MG/DL (ref 70–110)

## 2025-03-24 PROCEDURE — 76819 FETAL BIOPHYS PROFIL W/O NST: CPT | Mod: S$GLB,,, | Performed by: OBSTETRICS & GYNECOLOGY

## 2025-03-24 PROCEDURE — 1159F MED LIST DOCD IN RCRD: CPT | Mod: CPTII,S$GLB,, | Performed by: OBSTETRICS & GYNECOLOGY

## 2025-03-24 PROCEDURE — 1160F RVW MEDS BY RX/DR IN RCRD: CPT | Mod: CPTII,S$GLB,, | Performed by: OBSTETRICS & GYNECOLOGY

## 2025-03-24 PROCEDURE — 82962 GLUCOSE BLOOD TEST: CPT | Mod: ,,, | Performed by: OBSTETRICS & GYNECOLOGY

## 2025-03-24 PROCEDURE — 3008F BODY MASS INDEX DOCD: CPT | Mod: CPTII,S$GLB,, | Performed by: OBSTETRICS & GYNECOLOGY

## 2025-03-24 PROCEDURE — 99214 OFFICE O/P EST MOD 30 MIN: CPT | Mod: TH,S$GLB,, | Performed by: OBSTETRICS & GYNECOLOGY

## 2025-03-24 PROCEDURE — 3077F SYST BP >= 140 MM HG: CPT | Mod: CPTII,S$GLB,, | Performed by: OBSTETRICS & GYNECOLOGY

## 2025-03-24 PROCEDURE — 76816 OB US FOLLOW-UP PER FETUS: CPT | Mod: S$GLB,,, | Performed by: OBSTETRICS & GYNECOLOGY

## 2025-03-24 PROCEDURE — 3079F DIAST BP 80-89 MM HG: CPT | Mod: CPTII,S$GLB,, | Performed by: OBSTETRICS & GYNECOLOGY

## 2025-03-24 RX ORDER — DIPHENHYDRAMINE HCL 25 MG
TABLET ORAL
COMMUNITY
Start: 2025-03-05

## 2025-03-24 RX ORDER — LANCETS 33 GAUGE
EACH MISCELLANEOUS
COMMUNITY
Start: 2025-03-05

## 2025-03-24 RX ORDER — CALCIUM CITRATE/VITAMIN D3 200MG-6.25
TABLET ORAL
COMMUNITY
Start: 2025-03-05

## 2025-04-02 PROBLEM — O10.013 PRE-EXISTING ESSENTIAL HYPERTENSION AFFECTING PREGNANCY IN THIRD TRIMESTER: Status: ACTIVE | Noted: 2024-12-30

## 2025-04-02 NOTE — PROGRESS NOTES
Maternal Fetal Medicine Follow Up    Subjective:     Patient ID: [id number]    Chief Complaint: M follow up w/us       HPI: Michael Colby is a 30 y.o. female  at 33w4d gestation with Estimated Date of Delivery: 25  who is here for follow-up consultation by M.    She was diagnosed with chronic hypertension earlier in this pregnancy with multiple elevated BP readings prior to 20 weeks.  She is on labetalol 100 mg every 12 hours.  BP log was brought with her (see below). On 2025, she had baseline 24 hour urine with 149 mg protein. She is on low-dose aspirin twice daily for preeclampsia prophylaxis.  Her BMI was 29 at initial OB visit.  She had a small maternal right ovarian cyst seen on consult ultrasound.  She also has gestational diabetes.  She is on diabetic diet and brought a log for review today.       Interval history since last MFM visit: None.. She denies any leaking fluid, vaginal bleeding, contractions, decreased fetal movement. Denies headaches, visual disturbances, or epigastric pain.    Pregnancy complications include:   Patient Active Problem List   Diagnosis    Pre-existing essential hypertension affecting pregnancy in third trimester    (BMI 25.0-29.9)    Right ovarian cyst    Diet controlled gestational diabetes mellitus (GDM) in third trimester       No changes to medical, surgical, family, social, or obstetric history.    Medications:  Current Outpatient Medications   Medication Instructions    aspirin (ECOTRIN) 81 mg, Oral, 2 times daily, Start after 12 weeks gestation.  At 35 weeks gestation, decrease to one 81mg tablet daily until delivery.    labetaloL (NORMODYNE) 100 mg, Oral, Every 12 hours    PNV,calcium 72/iron/folic acid (PRENATAL VITAMIN) Tab 1 tablet, Oral, Daily    TRUE METRIX AIR GLUCOSE METER [facility] USE TO CHECK GLUCOSE 4 TIMES DAILY (IN AM FASTING, THEN 1 HOUR AFTER EACH MEAL)    TRUE METRIX GLUCOSE TEST STRIP Strp SMARTSIG:Via Meter    TRUEPLUS LANCETS 33 gauge Misc  "Apply topically.       Review of Systems   12 point review of systems conducted, negative except as stated in the history of present illness. See HPI for details.      Objective:     Visit Vitals  BP (!) 140/89 (BP Location: Left arm, Patient Position: Sitting)   Pulse 110   Ht 5' 7" (1.702 m)   Wt 97.5 kg (215 lb)   LMP 2024 (Exact Date)   BMI 33.67 kg/m²        Physical Exam  Vitals and nursing note reviewed.   Constitutional:       Appearance: Normal appearance.      Comments: Increased BMI   HENT:      Head: Normocephalic and atraumatic.      Nose: Nose normal. No congestion.   Cardiovascular:      Rate and Rhythm: Normal rate.   Pulmonary:      Effort: Pulmonary effort is normal.   Skin:     Findings: No rash.   Neurological:      Mental Status: She is alert and oriented to person, place, and time.   Psychiatric:         Mood and Affect: Mood normal.         Behavior: Behavior normal.         Thought Content: Thought content normal.         Judgment: Judgment normal.         Assessment/Plan:     30 y.o.  female with IUP at 33w4d    Chronic hypertension  There was normal fetal growth with an EFW of 1897 g at the 27% and the AC at the 38% on 3/24/25  AFV is normal.  Biophysical profile  on today, 2025     Chronic hypertension is associated with significant adverse pregnancy outcomes including  birth, fetal growth restriction, fetal demise, placental abruption, and  delivery. Based on findings of recent CHAP Study, it is recommended to utilize 140/90 as the threshold for initiation or titration of medical therapy for chronic hypertension in pregnancy, rather than the previously recommended threshold of 160/110. For patients on blood pressure medications at the start of pregnancy, in the absence of mitigating factors or side effects, they can be maintained on their medications, rather than discontinuing them and waiting to initiate treatment for blood pressures in the severe " range.    Vitals:    25 0834 25 0836   BP: (!) 142/96 (!) 140/89     With this BP, and blood pressures at home that are up to 140 systolic and up to 90s diastolic, advised the patient to adjusted dose of labetalol to 100 mg q.8 hours       Low dose aspirin as discussed.    She would benefit from follow-up ultrasounds to monitor growth every 4 weeks until the end of pregnancy. Recommend twice weekly fetal testing, alternating with primary OB, until delivery.  Reviewed fetal kick count instructions.    With With chronic hypertension on medication, gestational diabetes mellitus diet controlled  recommend delivery at 38 weeks' gestation (38-38 6/7th weeks) as optimal balance between prematurity risks and risks of continued pregnancy. Earlier delivery may be needed for worsening maternal or fetal status.      Recommend close postpartum followup with Primary OB as well as PCP for ongoing evaluation/treatment of HTN.        Gestational diabetes  Continue 2200 calorie ADA diet.  Log reviewed.  All blood sugars are normal and continue Diabetic and low-salt diet      She was advised to check glucose 2 times a day at alternating times of the day and bring log to each appointment.    Fetal surveillance as above.    The association of gestational diabetes (50%) with adult-onset type 2 diabetes mellitus was reviewed.  She was advised of importance of losing weight after the pregnancy, as well as doing a 75g 2hr glucose tolerance test after postpartum exam, and checkups every 1-3 years for blood sugars to make sure she does not develop diabetes.         BMI greater than 30  Body mass index is 33.67 kg/m². With stable weight since last visit, she was advised to continue healthy, low-sodium, diabetic diet avoiding any excessive weight gain.  Excess weight gain would be associated with worsening hypertension, worsening gestational diabetes and adverse  outcomes, including fetal demise in utero.    Reviewed  importance of FKC 3/day and prn with instructions to immediately report any decreased fetal movement.    It is important to lose weight after the pregnancy is over, especially before a future pregnancy. Breastfeeding may be an important tool in reducing the postpartum weight retention. Fetal risks were discussed with short term risk of fetal/ obesity and long term risk of adolescent component of metabolic syndrome.      Preeclampsia prophylaxis  She was advised to continue asa 81 mg BID until 34 6/7 weeks, then decrease to once daily until delivery. Preeclampsia precautions reviewed.         Follow up in 1 week (on 4/10/2025) for Keep return appointment., [facility] follow-up, B[facility].     Future Appointments   Date Time Provider Department Center   4/10/2025  9:45 AM ROOM 3, [facility]   4/10/2025 10:00 AM Bennie Larsen MD [facility]   2025  9:00 AM ROOM 1, [facility]   2025  9:15 AM Keerthi Pedraza WHNP [facility]       BRENDA involvement: Patient was evaluated and examined by Dr. Larsen. ASHLEY Wagoner, helped in pre charting of part of note.    This note was created with the assistance of [employer] voice recognition software. There may be transcription errors as a result of using this technology, however minimal. Effort has been made to ensure accuracy of transcription, but any obvious errors or omissions should be clarified with the author of the document.

## 2025-04-03 ENCOUNTER — OFFICE VISIT (OUTPATIENT)
Dept: MATERNAL FETAL MEDICINE | Facility: CLINIC | Age: 30
End: 2025-04-03
Payer: MEDICAID

## 2025-04-03 ENCOUNTER — PROCEDURE VISIT (OUTPATIENT)
Dept: MATERNAL FETAL MEDICINE | Facility: CLINIC | Age: 30
End: 2025-04-03
Payer: MEDICAID

## 2025-04-03 VITALS
BODY MASS INDEX: 33.74 KG/M2 | SYSTOLIC BLOOD PRESSURE: 140 MMHG | DIASTOLIC BLOOD PRESSURE: 89 MMHG | HEIGHT: 67 IN | HEART RATE: 110 BPM | WEIGHT: 215 LBS

## 2025-04-03 DIAGNOSIS — E66.3 OVERWEIGHT (BMI 25.0-29.9): ICD-10-CM

## 2025-04-03 DIAGNOSIS — O24.410 DIET CONTROLLED GESTATIONAL DIABETES MELLITUS (GDM) IN THIRD TRIMESTER: ICD-10-CM

## 2025-04-03 DIAGNOSIS — O10.013 PRE-EXISTING ESSENTIAL HYPERTENSION AFFECTING PREGNANCY IN THIRD TRIMESTER: Primary | ICD-10-CM

## 2025-04-03 DIAGNOSIS — O10.012 PRE-EXISTING ESSENTIAL HYPERTENSION AFFECTING PREGNANCY IN SECOND TRIMESTER: ICD-10-CM

## 2025-04-03 DIAGNOSIS — O24.410 GESTATIONAL DIABETES MELLITUS, CLASS A1: ICD-10-CM

## 2025-04-03 DIAGNOSIS — N83.201 RIGHT OVARIAN CYST: ICD-10-CM

## 2025-04-09 NOTE — PROGRESS NOTES
Maternal Fetal Medicine Follow Up    Subjective:     Patient ID: [id number]    Chief Complaint: M follow up w/us       HPI: Michael Colby is a 30 y.o. female  at 34w4d gestation with Estimated Date of Delivery: 25  who is here for follow-up consultation by MFM.    She was diagnosed with chronic hypertension earlier in this pregnancy with multiple elevated BP readings prior to 20 weeks.  She is on labetalol 100 mg every 8 hours.  On 2025, she had baseline 24 hour urine with 149 mg protein. She is on low-dose aspirin twice daily for preeclampsia prophylaxis.  Her BMI was 29 at initial OB visit.  She had a small maternal right ovarian cyst seen on consult ultrasound.  She also has gestational diabetes.  She is on diabetic diet and brought a log for review today.       Interval history since last MFM visit: None.. She denies any leaking fluid, vaginal bleeding, contractions, decreased fetal movement. Denies headaches, visual disturbances, or epigastric pain.    Pregnancy complications include:   Patient Active Problem List   Diagnosis    Pre-existing essential hypertension affecting pregnancy in third trimester    (BMI 25.0-29.9)    Right ovarian cyst    Diet controlled gestational diabetes mellitus (GDM) in third trimester       No changes to medical, surgical, family, social, or obstetric history.    Medications:  Current Outpatient Medications   Medication Instructions    aspirin (ECOTRIN) 81 mg, Oral, 2 times daily, Start after 12 weeks gestation.  At 35 weeks gestation, decrease to one 81mg tablet daily until delivery.    labetaloL (NORMODYNE) 100 mg, Oral, Every 12 hours    PNV,calcium 72/iron/folic acid (PRENATAL VITAMIN) Tab 1 tablet, Oral, Daily    TRUE METRIX AIR GLUCOSE METER Misc USE TO CHECK GLUCOSE 4 TIMES DAILY (IN AM FASTING, THEN 1 HOUR AFTER EACH MEAL)    TRUE METRIX GLUCOSE TEST STRIP Strp SMARTSIG:Via Meter    TRUEPLUS LANCETS 33 gauge Misc Apply topically.       Review of Systems   12  "point review of systems conducted, negative except as stated in the history of present illness. See HPI for details.      Objective:     Visit Vitals  /82 (BP Location: Left arm, Patient Position: Sitting)   Pulse 100   Ht 5' 7" (1.702 m)   Wt 98.9 kg (218 lb)   LMP 2024 (Exact Date)   BMI 34.14 kg/m²        Physical Exam  Vitals and nursing note reviewed.   Constitutional:       Appearance: Normal appearance.      Comments: Increased BMI   HENT:      Head: Normocephalic and atraumatic.      Nose: Nose normal. No congestion.   Cardiovascular:      Rate and Rhythm: Normal rate.   Pulmonary:      Effort: Pulmonary effort is normal.   Skin:     Findings: No rash.   Neurological:      Mental Status: She is alert and oriented to person, place, and time.   Psychiatric:         Mood and Affect: Mood normal.         Behavior: Behavior normal.         Thought Content: Thought content normal.         Judgment: Judgment normal.         Assessment/Plan:     30 y.o.  female with IUP at 34w4d    Chronic hypertension  There was normal fetal growth with an EFW of 1897 g at the 27% and the AC at the 38% on 3/24/25  AFV is normal.  Biophysical profile  on today, 04/10/2025     Chronic hypertension is associated with significant adverse pregnancy outcomes including  birth, fetal growth restriction, fetal demise, placental abruption, and  delivery. Based on findings of recent CHAP Study, it is recommended to utilize 140/90 as the threshold for initiation or titration of medical therapy for chronic hypertension in pregnancy, rather than the previously recommended threshold of 160/110. For patients on blood pressure medications at the start of pregnancy, in the absence of mitigating factors or side effects, they can be maintained on their medications, rather than discontinuing them and waiting to initiate treatment for blood pressures in the severe range.    Vitals:    04/10/25 0954 04/10/25 0959   BP: " (!) 138/97 134/82       With this BP,  we will continue labetalol 100 mg every 8 hours       Low dose aspirin as discussed.    She would benefit from follow-up ultrasounds to monitor growth every 4 weeks until the end of pregnancy. Recommend twice weekly fetal testing, alternating with primary OB, until delivery.  Reviewed fetal kick count instructions.    With chronic hypertension on medication, gestational diabetes mellitus diet controlled  recommend delivery at 38 weeks' gestation (38-38 6/7th weeks) as optimal balance between prematurity risks and risks of continued pregnancy. Earlier delivery may be needed for worsening maternal or fetal status.      Recommend close postpartum followup with Primary OB as well as PCP for ongoing evaluation/treatment of HTN.        Gestational diabetes  Continue 2200 calorie ADA diet.  Log reviewed.  Blood sugars are normal patient will complete with checking them intermittently and follow diabetic low-salt diet .      She was advised to check glucose 2 times a day at alternating times of the day and bring log to each appointment.    Fetal surveillance as above.    The association of gestational diabetes (50%) with adult-onset type 2 diabetes mellitus was reviewed.  She was advised of importance of losing weight after the pregnancy, as well as doing a 75g 2hr glucose tolerance test after postpartum exam, and checkups every 1-3 years for blood sugars to make sure she does not develop diabetes.         BMI greater than 30  Body mass index is 34.14 kg/m². With excessive gain of 3 lb in 1 week, reviewed importance of following healthy, low-sodium, diabetic diet avoiding any additional excessive weight gain.  Excess weight gain would be associated with worsening hypertension, worsening gestational diabetes and adverse  outcomes, including fetal demise in utero.    Reviewed importance of FKC 3/day and prn with instructions to immediately report any decreased fetal  movement.    It is important to lose weight after the pregnancy is over, especially before a future pregnancy. Breastfeeding may be an important tool in reducing the postpartum weight retention. Fetal risks were discussed with short term risk of fetal/ obesity and long term risk of adolescent component of metabolic syndrome.      Preeclampsia prophylaxis  She was advised to continue asa 81 mg BID until 34 6/7 weeks, then decrease to once daily until delivery. Preeclampsia precautions reviewed.     Fetal kick counts and preeclampsia warnings.      Follow up for Keep return appointment., keep return appointment.     Future Appointments   Date Time Provider Department Center   2025  9:00 AM ROOM 1, [facility]   2025  9:15 AM Keerthi Pedraza WHNP [facility] [place] [facility]   2025 11:30 AM Bennie Larsen MD [facility] [facility]   2025 11:30 AM ROOM 1, [facility] US M [facility] [place] [facility]   2025 10:45 AM ROOM 3, [facility]   2025 11:00 AM Bennie Larsen MD [facility] [place] [facility]       BRENDA involvement: Patient was evaluated and examined by Dr. Larsen. ASHLEY Wagoner, helped in pre charting of part of note.    This note was created with the assistance of [employer] voice recognition software. There may be transcription errors as a result of using this technology, however minimal. Effort has been made to ensure accuracy of transcription, but any obvious errors or omissions should be clarified with the author of the document.

## 2025-04-10 ENCOUNTER — PROCEDURE VISIT (OUTPATIENT)
Dept: MATERNAL FETAL MEDICINE | Facility: CLINIC | Age: 30
End: 2025-04-10
Payer: MEDICAID

## 2025-04-10 ENCOUNTER — OFFICE VISIT (OUTPATIENT)
Dept: MATERNAL FETAL MEDICINE | Facility: CLINIC | Age: 30
End: 2025-04-10
Payer: MEDICAID

## 2025-04-10 VITALS
HEART RATE: 100 BPM | SYSTOLIC BLOOD PRESSURE: 134 MMHG | BODY MASS INDEX: 34.21 KG/M2 | WEIGHT: 218 LBS | DIASTOLIC BLOOD PRESSURE: 82 MMHG | HEIGHT: 67 IN

## 2025-04-10 DIAGNOSIS — O24.410 DIET CONTROLLED GESTATIONAL DIABETES MELLITUS (GDM) IN THIRD TRIMESTER: ICD-10-CM

## 2025-04-10 DIAGNOSIS — O10.013 PRE-EXISTING ESSENTIAL HYPERTENSION AFFECTING PREGNANCY IN THIRD TRIMESTER: Primary | ICD-10-CM

## 2025-04-10 DIAGNOSIS — O10.012 PRE-EXISTING ESSENTIAL HYPERTENSION AFFECTING PREGNANCY IN SECOND TRIMESTER: ICD-10-CM

## 2025-04-10 DIAGNOSIS — O24.410 GESTATIONAL DIABETES MELLITUS, CLASS A1: ICD-10-CM

## 2025-04-10 DIAGNOSIS — E66.3 OVERWEIGHT (BMI 25.0-29.9): ICD-10-CM

## 2025-04-10 LAB — GLUCOSE SERPL-MCNC: 90 MG/DL (ref 70–110)

## 2025-04-14 DIAGNOSIS — O10.013 PRE-EXISTING ESSENTIAL HYPERTENSION AFFECTING PREGNANCY IN THIRD TRIMESTER: Primary | ICD-10-CM

## 2025-04-17 ENCOUNTER — OFFICE VISIT (OUTPATIENT)
Dept: MATERNAL FETAL MEDICINE | Facility: CLINIC | Age: 30
End: 2025-04-17
Payer: MEDICAID

## 2025-04-17 ENCOUNTER — PROCEDURE VISIT (OUTPATIENT)
Dept: MATERNAL FETAL MEDICINE | Facility: CLINIC | Age: 30
End: 2025-04-17
Payer: MEDICAID

## 2025-04-17 VITALS
WEIGHT: 221.38 LBS | BODY MASS INDEX: 34.75 KG/M2 | HEART RATE: 103 BPM | DIASTOLIC BLOOD PRESSURE: 80 MMHG | HEIGHT: 67 IN | SYSTOLIC BLOOD PRESSURE: 136 MMHG

## 2025-04-17 DIAGNOSIS — O10.013 PRE-EXISTING ESSENTIAL HYPERTENSION AFFECTING PREGNANCY IN THIRD TRIMESTER: ICD-10-CM

## 2025-04-17 DIAGNOSIS — E66.3 OVERWEIGHT (BMI 25.0-29.9): ICD-10-CM

## 2025-04-17 DIAGNOSIS — O24.410 DIET CONTROLLED GESTATIONAL DIABETES MELLITUS (GDM) IN THIRD TRIMESTER: ICD-10-CM

## 2025-04-17 DIAGNOSIS — N83.201 RIGHT OVARIAN CYST: ICD-10-CM

## 2025-04-17 DIAGNOSIS — O10.013 PRE-EXISTING ESSENTIAL HYPERTENSION AFFECTING PREGNANCY IN THIRD TRIMESTER: Primary | ICD-10-CM

## 2025-04-17 LAB — GLUCOSE SERPL-MCNC: 89 MG/DL (ref 70–110)

## 2025-04-17 PROCEDURE — 76819 FETAL BIOPHYS PROFIL W/O NST: CPT | Mod: S$GLB,,, | Performed by: OBSTETRICS & GYNECOLOGY

## 2025-04-23 ENCOUNTER — DOCUMENTATION ONLY (OUTPATIENT)
Dept: MATERNAL FETAL MEDICINE | Facility: CLINIC | Age: 30
End: 2025-04-23
Payer: MEDICAID

## 2025-04-23 DIAGNOSIS — O24.410 DIET CONTROLLED GESTATIONAL DIABETES MELLITUS (GDM) IN THIRD TRIMESTER: ICD-10-CM

## 2025-04-23 DIAGNOSIS — Z36.89 ENCOUNTER FOR ULTRASOUND TO ASSESS FETAL GROWTH: ICD-10-CM

## 2025-04-23 DIAGNOSIS — O10.013 PRE-EXISTING ESSENTIAL HYPERTENSION AFFECTING PREGNANCY IN THIRD TRIMESTER: Primary | ICD-10-CM

## 2025-04-24 ENCOUNTER — OFFICE VISIT (OUTPATIENT)
Dept: MATERNAL FETAL MEDICINE | Facility: CLINIC | Age: 30
End: 2025-04-24
Payer: MEDICAID

## 2025-04-24 ENCOUNTER — PROCEDURE VISIT (OUTPATIENT)
Dept: MATERNAL FETAL MEDICINE | Facility: CLINIC | Age: 30
End: 2025-04-24
Payer: MEDICAID

## 2025-04-24 VITALS
DIASTOLIC BLOOD PRESSURE: 89 MMHG | WEIGHT: 223.69 LBS | SYSTOLIC BLOOD PRESSURE: 132 MMHG | HEIGHT: 67 IN | HEART RATE: 107 BPM | BODY MASS INDEX: 35.11 KG/M2

## 2025-04-24 DIAGNOSIS — O24.410 DIET CONTROLLED GESTATIONAL DIABETES MELLITUS (GDM) IN THIRD TRIMESTER: Primary | ICD-10-CM

## 2025-04-24 DIAGNOSIS — O10.013 PRE-EXISTING ESSENTIAL HYPERTENSION AFFECTING PREGNANCY IN THIRD TRIMESTER: ICD-10-CM

## 2025-04-24 DIAGNOSIS — O99.013 ANEMIA COMPLICATING PREGNANCY IN THIRD TRIMESTER: ICD-10-CM

## 2025-04-24 DIAGNOSIS — O24.410 DIET CONTROLLED GESTATIONAL DIABETES MELLITUS (GDM) IN THIRD TRIMESTER: ICD-10-CM

## 2025-04-24 LAB — GLUCOSE SERPL-MCNC: 101 MG/DL (ref 70–110)

## 2025-04-24 NOTE — PROGRESS NOTES
Maternal Fetal Medicine Follow Up    Subjective:     Patient ID: [id number]    Chief Complaint: mfm followup w/us      HPI: Michael Colby is a 30 y.o. female  at 36w4d gestation with Estimated Date of Delivery: 25  who is here for follow-up consultation by MFM.    She was diagnosed with chronic hypertension earlier in this pregnancy with multiple elevated BP readings prior to 20 weeks.  She is on labetalol 100 mg every 8 hours.  On 2025, she had baseline 24 hour urine with 149 mg protein. Pre-eclampsia labs done on 2025 are as follows Hemoglobin 10.0, Hematocrit 31.7, platelets 345, , uric acid 4.5, creatinine 0.52, AST 16, ALT 15.  Patient states she is on iron twice a day for the anemia.  She is on low-dose aspirin once daily for preeclampsia prophylaxis.  Her BMI was 29 at initial OB visit.  She had a small maternal right ovarian cyst seen on consult ultrasound.  She also has gestational diabetes.  She is on diabetic diet and brought a log for review today.           Interval history since last MFM visit: None.. She denies any leaking fluid, vaginal bleeding, contractions, decreased fetal movement. Denies headaches, visual disturbances, or epigastric pain.    Pregnancy complications include:   Patient Active Problem List   Diagnosis    Pre-existing essential hypertension affecting pregnancy in third trimester    (BMI 25.0-29.9)    Right ovarian cyst    Diet controlled gestational diabetes mellitus (GDM) in third trimester       No changes to medical, surgical, family, social, or obstetric history.    Medications:  Current Outpatient Medications   Medication Instructions    aspirin (ECOTRIN) 81 mg, Oral, 2 times daily, Start after 12 weeks gestation.  At 35 weeks gestation, decrease to one 81mg tablet daily until delivery.    labetaloL (NORMODYNE) 100 mg, Oral, Every 12 hours    PNV,calcium 72/iron/folic acid (PRENATAL VITAMIN) Tab 1 tablet, Oral, Daily    TRUE METRIX AIR GLUCOSE METER  "Misc USE TO CHECK GLUCOSE 4 TIMES DAILY (IN AM FASTING, THEN 1 HOUR AFTER EACH MEAL)    TRUE METRIX GLUCOSE TEST STRIP Strp SMARTSIG:Via Meter    TRUEPLUS LANCETS 33 gauge Misc Apply topically.       Review of Systems   12 point review of systems conducted, negative except as stated in the history of present illness. See HPI for details.      Objective:     Visit Vitals  /89 (BP Location: Right arm, Patient Position: Sitting)   Pulse 107   Ht 5' 7" (1.702 m)   Wt 101.5 kg (223 lb 11.2 oz)   LMP 2024 (Exact Date)   BMI 35.04 kg/m²        Physical Exam  Vitals and nursing note reviewed.   Constitutional:       Appearance: Normal appearance.      Comments: Increased BMI   HENT:      Head: Normocephalic and atraumatic.      Nose: Nose normal. No congestion.   Cardiovascular:      Rate and Rhythm: Normal rate.   Pulmonary:      Effort: Pulmonary effort is normal.   Skin:     Findings: No rash.   Neurological:      Mental Status: She is alert and oriented to person, place, and time.   Psychiatric:         Mood and Affect: Mood normal.         Behavior: Behavior normal.         Thought Content: Thought content normal.         Judgment: Judgment normal.         Assessment/Plan:     30 y.o.  female with IUP at 36w4d    Chronic hypertension  There is normal fetal growth with an EFW of 2802 g at the 29% and the AC at the 31% on 2025  AFV is normal.  Biophysical profile  on today, 2025      Chronic hypertension is associated with significant adverse pregnancy outcomes including  birth, fetal growth restriction, fetal demise, placental abruption, and  delivery. Based on findings of recent CHAP Study, it is recommended to utilize 140/90 as the threshold for initiation or titration of medical therapy for chronic hypertension in pregnancy, rather than the previously recommended threshold of 160/110. For patients on blood pressure medications at the start of pregnancy, in the absence of " mitigating factors or side effects, they can be maintained on their medications, rather than discontinuing them and waiting to initiate treatment for blood pressures in the severe range.    Vitals:    04/24/25 0943 04/24/25 1000   BP: (!) 139/93 132/89       With this BP that is a little bit elevated and labile , asymptomatic, she was advised to adjusted dose of labetalol to 200 mg q.12h hours     Advised to continue to check blood pressure at home a few times a day.  Reviewed reportable ranges.    Low dose aspirin as discussed.    Recommend twice weekly fetal testing, alternating with primary OB, until delivery.  Reviewed fetal kick count instructions.    With chronic hypertension on medication, gestational diabetes mellitus diet controlled, and Body mass index is 35.04 kg/m².  at this time recommend delivery at 38 weeks' gestation (38-38 6/7th weeks) as optimal balance between prematurity risks and risks of continued pregnancy. Earlier delivery may be needed for worsening maternal or fetal status.    Recommend close postpartum followup with Primary OB as well as PCP for ongoing evaluation/treatment of HTN.        Gestational diabetes  Continue 2200 calorie ADA diet.  Log reviewed all blood sugars are within normal range . She was advised to continue diabetic, low-salt diet .      She was advised to check glucose 2 times a day at alternating times of the day and bring log to each appointment.    Fetal surveillance as above.    The association of gestational diabetes (50%) with adult-onset type 2 diabetes mellitus was reviewed.  She was advised of importance of losing weight after the pregnancy, as well as doing a 75g 2hr glucose tolerance test after postpartum exam, and checkups every 1-3 years for blood sugars to make sure she does not develop diabetes.         BMI greater than 30 mild excessive weight gain during pregnancy  Body mass index is 35.04 kg/m².  Patient has gained 22 lb since initial MFM consult visit  when she was 20 weeks, with a 2 lb weight gain in the last week. , it is important to continue following healthy, low-sodium, diabetic diet to avoid any additional excessive weight gain. Excess weight gain would be associated with worsening hypertension, worsening gestational diabetes and adverse  outcomes, including fetal demise in utero.    Reviewed importance of FKC 3/day and prn with instructions to immediately report any decreased fetal movement.    It is important to lose weight after the pregnancy is over, especially before a future pregnancy. Breastfeeding may be an important tool in reducing the postpartum weight retention. Fetal risks were discussed with short term risk of fetal/ obesity and long term risk of adolescent component of metabolic syndrome.      Preeclampsia prophylaxis  She was advised to continue low-dose aspirin once daily for preeclampsia prophylaxis.  Preeclampsia precautions reviewed.        Mild anemia    Hemoglobin hematocrit 10.6 and 31.7 on 2025.  Patient will continue hematinic therapy.    Follow up for Keep return appointment..     Future Appointments   Date Time Provider Department Center   2025 10:45 AM ROOM 3, [facility]   2025 11:00 AM Bennie Larsen MD [facility]   2025  9:15 AM ROOM 3, [facility]   2025  9:45 AM Bennie Larsen MD [facility]         Patient was evaluated and examined by Dr. Larsen. Evelina Hamilton MA, helped in pre charting of part of note.     This note was created with the assistance of [employer] voice recognition software. There may be transcription errors as a result of using this technology, however minimal. Effort has been made to ensure accuracy of transcription, but any obvious errors or omissions should be clarified with the author of the document.

## 2025-05-01 ENCOUNTER — OFFICE VISIT (OUTPATIENT)
Dept: MATERNAL FETAL MEDICINE | Facility: CLINIC | Age: 30
End: 2025-05-01
Payer: MEDICAID

## 2025-05-01 ENCOUNTER — PROCEDURE VISIT (OUTPATIENT)
Dept: MATERNAL FETAL MEDICINE | Facility: CLINIC | Age: 30
End: 2025-05-01
Payer: MEDICAID

## 2025-05-01 VITALS
SYSTOLIC BLOOD PRESSURE: 140 MMHG | DIASTOLIC BLOOD PRESSURE: 100 MMHG | BODY MASS INDEX: 35.47 KG/M2 | HEART RATE: 108 BPM | HEIGHT: 67 IN | WEIGHT: 226 LBS

## 2025-05-01 DIAGNOSIS — O10.013 PRE-EXISTING ESSENTIAL HYPERTENSION AFFECTING PREGNANCY IN THIRD TRIMESTER: ICD-10-CM

## 2025-05-01 DIAGNOSIS — O24.410 DIET CONTROLLED GESTATIONAL DIABETES MELLITUS (GDM) IN THIRD TRIMESTER: Primary | ICD-10-CM

## 2025-05-01 LAB — GLUCOSE SERPL-MCNC: 100 MG/DL (ref 70–110)

## 2025-05-01 NOTE — PROGRESS NOTES
Maternal Fetal Medicine Follow Up    Subjective:     Patient ID: [id number]    Chief Complaint: MFM follow up with US (GDM.  )      HPI: Michael Colby is a 30 y.o. female  at 37w4d gestation with Estimated Date of Delivery: 25  who is here for follow-up consultation by MFM.    She was diagnosed with chronic hypertension earlier in this pregnancy with multiple elevated BP readings prior to 20 weeks.  She is on labetalol 200 mg every 12 hours.  On 2025, she had baseline 24 hour urine with 149 mg protein. Pre-eclampsia labs done on 2025 are as follows Hemoglobin 10.0, Hematocrit 31.7, platelets 345, , uric acid 4.5, creatinine 0.52, AST 16, ALT 15.  Patient states she is on iron twice a day for the anemia.  She is on low-dose aspirin once daily for preeclampsia prophylaxis.  Her BMI was 29 at initial OB visit.  She had a small maternal right ovarian cyst seen on consult ultrasound.  She also has gestational diabetes. She is on diabetic diet   and brought a log for review     Patient reports she had been nervous as we are getting close to the induction date scheduled next Tuesday patient is accompanied by Mallory her significant other .           Interval history since last MFM visit: None.. She denies any leaking fluid, vaginal bleeding, contractions, decreased fetal movement. Denies headaches, visual disturbances, or epigastric pain.    Pregnancy complications include:   Patient Active Problem List   Diagnosis    Pre-existing essential hypertension affecting pregnancy in third trimester    (BMI 25.0-29.9)    Right ovarian cyst    Diet controlled gestational diabetes mellitus (GDM) in third trimester    Anemia complicating pregnancy in third trimester       No changes to medical, surgical, family, social, or obstetric history.    Medications:  Current Outpatient Medications   Medication Instructions    aspirin (ECOTRIN) 81 mg, Oral, 2 times daily, Start after 12 weeks gestation.  At 35 weeks  "gestation, decrease to one 81mg tablet daily until delivery.    labetaloL (NORMODYNE) 100 mg, Oral, Every 12 hours    PNV,calcium 72/iron/folic acid (PRENATAL VITAMIN) Tab 1 tablet, Oral, Daily    TRUE METRIX AIR GLUCOSE METER Misc USE TO CHECK GLUCOSE 4 TIMES DAILY (IN AM FASTING, THEN 1 HOUR AFTER EACH MEAL)    TRUE METRIX GLUCOSE TEST STRIP Strp SMARTSIG:Via Meter    TRUEPLUS LANCETS 33 gauge Misc Apply topically.       Review of Systems   12 point review of systems conducted, negative except as stated in the history of present illness. See HPI for details.      Objective:     Visit Vitals  BP (!) 140/100 (BP Location: Right arm, Patient Position: Sitting)   Pulse 108   Ht 5' 7" (1.702 m)   Wt 102.5 kg (226 lb)   LMP 2024 (Exact Date)   BMI 35.40 kg/m²        Physical Exam  Vitals and nursing note reviewed.   Constitutional:       Appearance: Normal appearance.      Comments: Increased BMI   HENT:      Head: Normocephalic and atraumatic.      Nose: Nose normal. No congestion.   Cardiovascular:      Rate and Rhythm: Normal rate.   Pulmonary:      Effort: Pulmonary effort is normal.   Skin:     Findings: No rash.   Neurological:      Mental Status: She is alert and oriented to person, place, and time.   Psychiatric:         Mood and Affect: Mood normal.         Behavior: Behavior normal.         Thought Content: Thought content normal.         Judgment: Judgment normal.         Assessment/Plan:     30 y.o.  female with IUP at 37w4d    Chronic hypertension  There is normal fetal growth with an EFW of 2802 g at the 29% and the AC at the 31% on 2025  AFV is normal.  Biophysical profile  on today, 2025        Chronic hypertension is associated with significant adverse pregnancy outcomes including  birth, fetal growth restriction, fetal demise, placental abruption, and  delivery. Based on findings of recent CHAP Study, it is recommended to utilize 140/90 as the threshold for " initiation or titration of medical therapy for chronic hypertension in pregnancy, rather than the previously recommended threshold of 160/110. For patients on blood pressure medications at the start of pregnancy, in the absence of mitigating factors or side effects, they can be maintained on their medications, rather than discontinuing them and waiting to initiate treatment for blood pressures in the severe range.    Vitals:    05/01/25 1105 05/01/25 1108 05/01/25 1202   BP: (!) 141/101 (!) 144/100 (!) 140/100         With this BP , with the absence of symptoms, patient was advised that we are going to go up on labetalol to 200 mg q.8 hours along with doing preeclampsia labs today.      Advised to continue to check blood pressure at home a few times a day.  Reviewed reportable ranges.    Low dose aspirin as discussed.    Recommend twice weekly fetal testing, alternating with primary OB, until delivery.  Reviewed fetal kick count instructions.    With chronic hypertension on medication, gestational diabetes mellitus diet controlled, and Body mass index is 35.4 kg/m².  at this time recommend delivery at 38 weeks' gestation (38-38 6/7th weeks) as optimal balance between prematurity risks and risks of continued pregnancy. Earlier delivery may be needed for worsening maternal or fetal status.    Recommend close postpartum followup with Primary OB as well as PCP for ongoing evaluation/treatment of HTN.        Gestational diabetes  Continue 2200 calorie ADA diet.  Log reviewed all blood sugars are within normal range   . She was advised to continue diabetic, low-salt diet .      She was advised to check glucose 2 times a day at alternating times of the day and bring log to each appointment.    Fetal surveillance as above.    The association of gestational diabetes (50%) with adult-onset type 2 diabetes mellitus was reviewed.  She was advised of importance of losing weight after the pregnancy, as well as doing a 75g 2hr  glucose tolerance test after postpartum exam, and checkups every 1-3 years for blood sugars to make sure she does not develop diabetes.         BMI greater than 30 mild excessive weight gain during pregnancy  Body mass index is 35.4 kg/m².  Patient has gained 25 lb since initial MFM consult visit when she was 20 weeks,  , it is important to continue following healthy, low-sodium, diabetic diet to avoid any additional excessive weight gain. Excess weight gain would be associated with worsening hypertension, worsening gestational diabetes and adverse  outcomes, including fetal demise in utero.    Reviewed importance of FKC 3/day and prn with instructions to immediately report any decreased fetal movement.    It is important to lose weight after the pregnancy is over, especially before a future pregnancy. Breastfeeding may be an important tool in reducing the postpartum weight retention. Fetal risks were discussed with short term risk of fetal/ obesity and long term risk of adolescent component of metabolic syndrome.      Preeclampsia prophylaxis  She was advised to continue low-dose aspirin once daily for preeclampsia prophylaxis.  Preeclampsia precautions reviewed.        Mild anemia    Hemoglobin hematocrit 10.6 and 31.7 on 2025.  Patient will continue hematinic therapy.    Follow up for keep return appointment.     No future appointments.        Patient was evaluated and examined by Dr. Larsen. Evelina Hamilton MA, helped in pre charting of part of note.     This note was created with the assistance of [employer] voice recognition software. There may be transcription errors as a result of using this technology, however minimal. Effort has been made to ensure accuracy of transcription, but any obvious errors or omissions should be clarified with the author of the document.